# Patient Record
Sex: FEMALE | Race: WHITE | NOT HISPANIC OR LATINO | Employment: OTHER | URBAN - METROPOLITAN AREA
[De-identification: names, ages, dates, MRNs, and addresses within clinical notes are randomized per-mention and may not be internally consistent; named-entity substitution may affect disease eponyms.]

---

## 2017-04-15 RX ORDER — LEVOTHYROXINE SODIUM 100 UG/1
TABLET ORAL
Qty: 30 TABLET | Refills: 3 | Status: SHIPPED | OUTPATIENT
Start: 2017-04-15 | End: 2018-04-18 | Stop reason: SDUPTHER

## 2017-04-15 RX ORDER — METFORMIN HYDROCHLORIDE 1000 MG/1
TABLET ORAL
Qty: 60 TABLET | Refills: 3 | Status: SHIPPED | OUTPATIENT
Start: 2017-04-15 | End: 2018-05-21 | Stop reason: SDUPTHER

## 2018-02-19 ENCOUNTER — TELEPHONE (OUTPATIENT)
Dept: FAMILY MEDICINE | Facility: CLINIC | Age: 63
End: 2018-02-19

## 2018-02-19 DIAGNOSIS — E78.5 HYPERLIPIDEMIA, UNSPECIFIED HYPERLIPIDEMIA TYPE: ICD-10-CM

## 2018-02-19 DIAGNOSIS — E03.9 HYPOTHYROIDISM, UNSPECIFIED TYPE: ICD-10-CM

## 2018-02-19 DIAGNOSIS — I10 HYPERTENSION, UNSPECIFIED TYPE: ICD-10-CM

## 2018-02-19 DIAGNOSIS — R73.03 PREDIABETES: ICD-10-CM

## 2018-02-19 DIAGNOSIS — Z00.00 ROUTINE GENERAL MEDICAL EXAMINATION AT A HEALTH CARE FACILITY: Primary | ICD-10-CM

## 2018-02-19 NOTE — TELEPHONE ENCOUNTER
----- Message from Alva Barrera MA sent at 2/19/2018  3:22 PM CST -----  Contact: Patient  I have scheduled the above patients annual physical and labs. Lab orders need to be linked to the appointment.  Date of annual:03/21/18  Date of Labs:03/14/18    Please advise   Thank You

## 2018-03-14 ENCOUNTER — LAB VISIT (OUTPATIENT)
Dept: LAB | Facility: HOSPITAL | Age: 63
End: 2018-03-14
Attending: FAMILY MEDICINE
Payer: COMMERCIAL

## 2018-03-14 DIAGNOSIS — E03.9 HYPOTHYROIDISM, UNSPECIFIED TYPE: ICD-10-CM

## 2018-03-14 DIAGNOSIS — Z00.00 ROUTINE GENERAL MEDICAL EXAMINATION AT A HEALTH CARE FACILITY: ICD-10-CM

## 2018-03-14 DIAGNOSIS — I10 HYPERTENSION, UNSPECIFIED TYPE: ICD-10-CM

## 2018-03-14 DIAGNOSIS — E78.5 HYPERLIPIDEMIA, UNSPECIFIED HYPERLIPIDEMIA TYPE: ICD-10-CM

## 2018-03-14 DIAGNOSIS — R73.03 PREDIABETES: ICD-10-CM

## 2018-03-14 LAB
ALBUMIN SERPL BCP-MCNC: 3.6 G/DL
ALP SERPL-CCNC: 113 U/L
ALT SERPL W/O P-5'-P-CCNC: 26 U/L
ANION GAP SERPL CALC-SCNC: 8 MMOL/L
AST SERPL-CCNC: 35 U/L
BASOPHILS # BLD AUTO: 0.05 K/UL
BASOPHILS NFR BLD: 0.6 %
BILIRUB SERPL-MCNC: 0.3 MG/DL
BUN SERPL-MCNC: 14 MG/DL
CALCIUM SERPL-MCNC: 10.7 MG/DL
CHLORIDE SERPL-SCNC: 106 MMOL/L
CHOLEST SERPL-MCNC: 147 MG/DL
CHOLEST/HDLC SERPL: 3 {RATIO}
CO2 SERPL-SCNC: 25 MMOL/L
CREAT SERPL-MCNC: 1 MG/DL
DIFFERENTIAL METHOD: ABNORMAL
EOSINOPHIL # BLD AUTO: 0.6 K/UL
EOSINOPHIL NFR BLD: 6.6 %
ERYTHROCYTE [DISTWIDTH] IN BLOOD BY AUTOMATED COUNT: 13 %
EST. GFR  (AFRICAN AMERICAN): >60 ML/MIN/1.73 M^2
EST. GFR  (NON AFRICAN AMERICAN): >60 ML/MIN/1.73 M^2
ESTIMATED AVG GLUCOSE: 123 MG/DL
GLUCOSE SERPL-MCNC: 103 MG/DL
HBA1C MFR BLD HPLC: 5.9 %
HCT VFR BLD AUTO: 35.3 %
HDLC SERPL-MCNC: 49 MG/DL
HDLC SERPL: 33.3 %
HGB BLD-MCNC: 11.5 G/DL
IMM GRANULOCYTES # BLD AUTO: 0.02 K/UL
IMM GRANULOCYTES NFR BLD AUTO: 0.2 %
LDLC SERPL CALC-MCNC: 74.8 MG/DL
LYMPHOCYTES # BLD AUTO: 2.2 K/UL
LYMPHOCYTES NFR BLD: 24.8 %
MCH RBC QN AUTO: 30 PG
MCHC RBC AUTO-ENTMCNC: 32.6 G/DL
MCV RBC AUTO: 92 FL
MONOCYTES # BLD AUTO: 0.6 K/UL
MONOCYTES NFR BLD: 7.2 %
NEUTROPHILS # BLD AUTO: 5.3 K/UL
NEUTROPHILS NFR BLD: 60.6 %
NONHDLC SERPL-MCNC: 98 MG/DL
NRBC BLD-RTO: 0 /100 WBC
PLATELET # BLD AUTO: 341 K/UL
PMV BLD AUTO: 9.3 FL
POTASSIUM SERPL-SCNC: 4.1 MMOL/L
PROT SERPL-MCNC: 7.7 G/DL
RBC # BLD AUTO: 3.83 M/UL
SODIUM SERPL-SCNC: 139 MMOL/L
TRIGL SERPL-MCNC: 116 MG/DL
TSH SERPL DL<=0.005 MIU/L-ACNC: 2.62 UIU/ML
WBC # BLD AUTO: 8.79 K/UL

## 2018-03-14 PROCEDURE — 80061 LIPID PANEL: CPT

## 2018-03-14 PROCEDURE — 85025 COMPLETE CBC W/AUTO DIFF WBC: CPT

## 2018-03-14 PROCEDURE — 36415 COLL VENOUS BLD VENIPUNCTURE: CPT | Mod: PO

## 2018-03-14 PROCEDURE — 80053 COMPREHEN METABOLIC PANEL: CPT

## 2018-03-14 PROCEDURE — 83036 HEMOGLOBIN GLYCOSYLATED A1C: CPT

## 2018-03-14 PROCEDURE — 84443 ASSAY THYROID STIM HORMONE: CPT

## 2018-03-21 ENCOUNTER — OFFICE VISIT (OUTPATIENT)
Dept: FAMILY MEDICINE | Facility: CLINIC | Age: 63
End: 2018-03-21
Payer: COMMERCIAL

## 2018-03-21 ENCOUNTER — HOSPITAL ENCOUNTER (OUTPATIENT)
Dept: RADIOLOGY | Facility: HOSPITAL | Age: 63
Discharge: HOME | End: 2018-03-21
Attending: FAMILY MEDICINE
Payer: COMMERCIAL

## 2018-03-21 VITALS
DIASTOLIC BLOOD PRESSURE: 82 MMHG | WEIGHT: 185.88 LBS | TEMPERATURE: 98 F | HEIGHT: 66 IN | SYSTOLIC BLOOD PRESSURE: 126 MMHG | HEART RATE: 84 BPM | BODY MASS INDEX: 29.87 KG/M2

## 2018-03-21 DIAGNOSIS — R73.03 PREDIABETES: ICD-10-CM

## 2018-03-21 DIAGNOSIS — I10 ESSENTIAL HYPERTENSION: ICD-10-CM

## 2018-03-21 DIAGNOSIS — E03.4 HYPOTHYROIDISM DUE TO ACQUIRED ATROPHY OF THYROID: ICD-10-CM

## 2018-03-21 DIAGNOSIS — Z12.39 SCREENING FOR BREAST CANCER: ICD-10-CM

## 2018-03-21 DIAGNOSIS — M25.472 ANKLE SWELLING, LEFT: ICD-10-CM

## 2018-03-21 DIAGNOSIS — G47.33 OSA (OBSTRUCTIVE SLEEP APNEA): ICD-10-CM

## 2018-03-21 DIAGNOSIS — Z00.00 ROUTINE GENERAL MEDICAL EXAMINATION AT A HEALTH CARE FACILITY: Primary | ICD-10-CM

## 2018-03-21 DIAGNOSIS — E83.52 HYPERCALCEMIA: ICD-10-CM

## 2018-03-21 DIAGNOSIS — E78.00 PURE HYPERCHOLESTEROLEMIA: ICD-10-CM

## 2018-03-21 DIAGNOSIS — F43.9 SITUATIONAL STRESS: ICD-10-CM

## 2018-03-21 LAB
CREAT UR-MCNC: 206 MG/DL
MICROALBUMIN UR DL<=1MG/L-MCNC: 119 UG/ML
MICROALBUMIN/CREATININE RATIO: 57.8 UG/MG

## 2018-03-21 PROCEDURE — 99999 PR PBB SHADOW E&M-EST. PATIENT-LVL IV: CPT | Mod: PBBFAC,,, | Performed by: FAMILY MEDICINE

## 2018-03-21 PROCEDURE — 99396 PREV VISIT EST AGE 40-64: CPT | Mod: S$GLB,,, | Performed by: FAMILY MEDICINE

## 2018-03-21 PROCEDURE — 82043 UR ALBUMIN QUANTITATIVE: CPT

## 2018-03-21 PROCEDURE — 73610 X-RAY EXAM OF ANKLE: CPT | Mod: 26,LT,, | Performed by: RADIOLOGY

## 2018-03-21 PROCEDURE — 73610 X-RAY EXAM OF ANKLE: CPT | Mod: TC,FY,PO,LT

## 2018-03-21 RX ORDER — SIMVASTATIN 40 MG/1
40 TABLET, FILM COATED ORAL NIGHTLY
COMMUNITY
End: 2018-03-29 | Stop reason: ALTCHOICE

## 2018-03-21 RX ORDER — ESCITALOPRAM OXALATE 10 MG/1
10 TABLET ORAL DAILY
Qty: 30 TABLET | Refills: 5 | Status: SHIPPED | OUTPATIENT
Start: 2018-03-21 | End: 2018-08-11 | Stop reason: SDUPTHER

## 2018-03-21 NOTE — PROGRESS NOTES
"Subjective:     Patient ID: Romelia Douglass is a 62 y.o. female.    Chief Complaint: Annual Exam  pts insurance changed last year and she had to see someone else. Now she is back to see me.   She states about a year ago she stretched her left ankle and something "popped" and she has had left ankle swelling ever since. She has been told this was due to venous insufficiency.   HPI  Review of Systems   Constitutional: Positive for fatigue. Negative for appetite change and fever.        Snores, she wakes easily -?gasps for breath   HENT: Negative for hearing loss and sore throat.    Eyes: Negative.    Respiratory: Negative for cough, chest tightness, shortness of breath and wheezing.    Cardiovascular: Negative for chest pain, palpitations and leg swelling.   Gastrointestinal: Negative for abdominal pain, blood in stool, constipation, diarrhea, nausea and vomiting.   Endocrine: Negative.    Genitourinary: Negative for difficulty urinating, dysuria, frequency, hematuria, menstrual problem, pelvic pain and urgency.   Musculoskeletal: Positive for arthralgias (left ankle swelling for a year- she was  diagnosed with a venous insuffiiciency -some mild pain if she has been standing on leg  all day ). Negative for back pain.   Skin: Negative for pallor and rash.   Allergic/Immunologic: Negative.    Neurological: Negative for dizziness, syncope, light-headedness and headaches.   Hematological: Negative for adenopathy.   Psychiatric/Behavioral: Negative for dysphoric mood (having trouble with getting easily upset and trouble focusing and concentrating, she feels anxious . ) and sleep disturbance (she isnt sleeping well.  she has trouble staying  asleep). The patient is nervous/anxious.         Under stress taking care of her dad who ha dementia and her mom has dementia       Objective:      Physical Exam   Constitutional: She is oriented to person, place, and time. She appears well-developed and well-nourished.   HENT:   Head: " Normocephalic and atraumatic.   Right Ear: External ear normal.   Left Ear: External ear normal.   Nose: Nose normal.   Small narrow airway and high riding tongue with wide neck    Eyes: Conjunctivae and EOM are normal. Pupils are equal, round, and reactive to light.   Neck: Normal range of motion. Neck supple. No JVD present. No thyromegaly present.   Cardiovascular: Normal rate, regular rhythm, normal heart sounds and intact distal pulses.    No murmur heard.  Pulmonary/Chest: Effort normal and breath sounds normal.   Abdominal: Soft. Bowel sounds are normal. She exhibits no mass. There is no tenderness.   Musculoskeletal: Normal range of motion. She exhibits no edema (swelling , large effusion about entire left ankle. no edema of left foot or lower left leg otherwise.).   Lymphadenopathy:     She has no cervical adenopathy.   Neurological: She is alert and oriented to person, place, and time. She has normal reflexes.   Skin: Skin is warm and dry.   Psychiatric: She has a normal mood and affect. Her behavior is normal. Judgment and thought content normal.   Vitals reviewed.      Assessment:     Romelia was seen today for annual exam.    Diagnoses and all orders for this visit:    Routine general medical examination at a health care facility    Screening for breast cancer  -     Mammo Digital Screening Bilat with CAD; Future    ROSANGELA (obstructive sleep apnea)  -     Home Sleep Studies; Future    Hypercalcemia  -     PTH, intact; Future  -     Calcium; Future    Ankle swelling, left  -     X-Ray Ankle Complete Left; Future    Essential hypertension  -     Microalbumin/creatinine urine ratio    Pure hypercholesterolemia  The current medical regimen is effective;  continue present plan and medications.    Hypothyroidism due to acquired atrophy of thyroid  Condition stable . Continue current medicine protocol.     Prediabetes  -     Microalbumin/creatinine urine ratio    Situational stress  -     escitalopram oxalate  (LEXAPRO) 10 MG tablet; Take 1 tablet (10 mg total) by mouth once daily.    Recheck in 2 months and will do pap then

## 2018-03-22 ENCOUNTER — TELEPHONE (OUTPATIENT)
Dept: FAMILY MEDICINE | Facility: CLINIC | Age: 63
End: 2018-03-22

## 2018-03-22 DIAGNOSIS — M25.472 EDEMA OF LEFT ANKLE: Primary | ICD-10-CM

## 2018-03-22 NOTE — TELEPHONE ENCOUNTER
Please notify pt that her xrays shows some arthritis and bone spurs and the swelling. Nothing else  Worrisome.  I will order the MRI

## 2018-03-23 ENCOUNTER — LAB VISIT (OUTPATIENT)
Dept: LAB | Facility: HOSPITAL | Age: 63
End: 2018-03-23
Attending: FAMILY MEDICINE
Payer: COMMERCIAL

## 2018-03-23 DIAGNOSIS — E83.52 HYPERCALCEMIA: ICD-10-CM

## 2018-03-23 LAB
CALCIUM SERPL-MCNC: 11 MG/DL
PTH-INTACT SERPL-MCNC: 120 PG/ML

## 2018-03-23 PROCEDURE — 82310 ASSAY OF CALCIUM: CPT

## 2018-03-23 PROCEDURE — 83970 ASSAY OF PARATHORMONE: CPT

## 2018-03-23 PROCEDURE — 36415 COLL VENOUS BLD VENIPUNCTURE: CPT | Mod: PO

## 2018-03-23 NOTE — TELEPHONE ENCOUNTER
Detailed VM left for the patient advising per Dr. Mendez's message below. Advised that she call back with any questions.

## 2018-03-25 ENCOUNTER — TELEPHONE (OUTPATIENT)
Dept: FAMILY MEDICINE | Facility: CLINIC | Age: 63
End: 2018-03-25

## 2018-03-25 DIAGNOSIS — E21.3 HYPERPARATHYROIDISM: Primary | ICD-10-CM

## 2018-03-26 NOTE — TELEPHONE ENCOUNTER
Please notify Shanelle that her repeat calcium level was high and so was the parathyroid level.  I am going to recommend that she meet with an endocrinologist for a consultation about this and see if they recommend any special treatment or additional testing. I will put in a referral to them   Also tell pt that her urine protein level was a bit high that suggests some strain on her kidneys. We need to control her blood pressure better to reduce strain on her kidneys

## 2018-03-29 ENCOUNTER — TELEPHONE (OUTPATIENT)
Dept: FAMILY MEDICINE | Facility: CLINIC | Age: 63
End: 2018-03-29

## 2018-03-29 RX ORDER — ATORVASTATIN CALCIUM 40 MG/1
40 TABLET, FILM COATED ORAL DAILY
Qty: 30 TABLET | Refills: 5 | Status: SHIPPED | OUTPATIENT
Start: 2018-03-29 | End: 2018-07-27 | Stop reason: SDUPTHER

## 2018-03-29 NOTE — TELEPHONE ENCOUNTER
"----- Message from Lor Galvan sent at 3/29/2018  9:39 AM CDT -----  Contact: pt [phone]  RX request - refill or new RX.  Is this a refill or new RX:  New   RX name and strength: atorvastin  Directions:   Is this a 30 day or 90 day RX:    Pharmacy name and phone # (DON'T enter "on file" or "in chart"): [id number] [place] [facility] [address] [phone] (Phone)  [phone] (Fax)  Comments:  Pt would like a call back on the status of this getting filled. Pt is confused about which medicine is for calcium and which one is for blood pressure. Please advise.       "

## 2018-03-29 NOTE — TELEPHONE ENCOUNTER
I have changed the med card to atorvastatin.  And no I dont believe the statin med choice has anything to do with her calcium being elevated.

## 2018-03-29 NOTE — TELEPHONE ENCOUNTER
Patient is requesting a refill for atorvastatin. Patient has simvastatin on medication list. Patient verbalizes that her calcium is elevated and is wondering if it could be caused by the atorvastatin. Patient voices that if calcium is elevated by atorvastatin should it be changed to simvastatin. Please advise.

## 2018-04-06 ENCOUNTER — TELEPHONE (OUTPATIENT)
Dept: FAMILY MEDICINE | Facility: CLINIC | Age: 63
End: 2018-04-06

## 2018-04-06 DIAGNOSIS — M25.472 EDEMA OF LEFT ANKLE: Primary | ICD-10-CM

## 2018-04-06 DIAGNOSIS — L53.9 ERYTHEMA: ICD-10-CM

## 2018-04-06 NOTE — TELEPHONE ENCOUNTER
----- Message from Angelikaaura Light sent at 4/6/2018 12:08 PM CDT -----  Contact: Patient [phone]  Patient is requesting a call about having her MRI at [facility] and need to have her orders.     Please call and advise.    Thank You

## 2018-04-06 NOTE — TELEPHONE ENCOUNTER
----- Message from Alyssa Gupta sent at 4/6/2018 11:43 AM CDT -----  Contact: Self Call  [phone]  Patient insurance will not cover her MRI at [facility]. Appointment this morning was cancelled. Need this to be done at [facility].

## 2018-04-16 DIAGNOSIS — I10 ESSENTIAL HYPERTENSION: ICD-10-CM

## 2018-04-16 NOTE — TELEPHONE ENCOUNTER
----- Message from Vi Wilcox sent at 4/16/2018 11:10 AM CDT -----  Contact: self [phone]  Type: Rx    Name of medication(s):  losartan (COZAAR) 100 MG tablet    Is this a refill? New rx? Refill       Who prescribed medication?    Pharmacy Name, Phone, & Location: [facility] [phone]     Comments: please advise, Thanks

## 2018-04-18 RX ORDER — LOSARTAN POTASSIUM 100 MG/1
100 TABLET ORAL DAILY
Qty: 30 TABLET | Refills: 5 | Status: SHIPPED | OUTPATIENT
Start: 2018-04-18 | End: 2018-07-18 | Stop reason: SDUPTHER

## 2018-04-18 RX ORDER — LEVOTHYROXINE SODIUM 100 UG/1
100 TABLET ORAL DAILY
Qty: 30 TABLET | Refills: 5 | Status: SHIPPED | OUTPATIENT
Start: 2018-04-18 | End: 2018-07-18 | Stop reason: SDUPTHER

## 2018-04-18 NOTE — TELEPHONE ENCOUNTER
"----- Message from Jacqueline Melendez sent at 4/18/2018  3:27 PM CDT -----  Contact: call pt a t  RX request - refill or new RX.  Is this a refill or new RX:  refill  RX name and strength: atorvastatin (LIPITOR) 40 MG tablet  Directions:   Is this a 30 day or 90 day RX:  30 day   Pharmacy name and phone # (DON'T enter "on file" or "in chart"): [facility]/pharmacy #[id number] - [place], [place] - [address] [phone] (Phone)   Comments:      RX request - refill or new RX.  Is this a refill or new RX:  refill  RX name and strength: escitalopram oxalate (LEXAPRO) 10 MG tablet  Directions:   Is this a 30 day or 90 day RX:  30 day   Pharmacy name and phone # (DON'T enter "on file" or "in chart"): [facility]/pharmacy #[id number] [facility] - [address] [phone] (Phone)   Comments:      RX request - refill or new RX.  Is this a refill or new RX:  refill  RX name and strength: levothyroxine (SYNTHROID) 100 MCG tablet  Directions:   Is this a 30 day or 90 day RX:  30 day  Pharmacy name and phone # (DON'T enter "on file" or "in chart"): [facility]/pharmacy #[id number] [facility]  [address] [phone] (Phone)   Comments:        "

## 2018-04-20 ENCOUNTER — TELEPHONE (OUTPATIENT)
Dept: FAMILY MEDICINE | Facility: CLINIC | Age: 63
End: 2018-04-20

## 2018-04-24 ENCOUNTER — TELEPHONE (OUTPATIENT)
Dept: FAMILY MEDICINE | Facility: CLINIC | Age: 63
End: 2018-04-24

## 2018-04-24 NOTE — TELEPHONE ENCOUNTER
----- Message from Lor Galvan sent at 4/23/2018  4:34 PM CDT -----  Contact: Pt. [phone]  Pt would like to know if   would like a CD of her MRI.

## 2018-04-24 NOTE — TELEPHONE ENCOUNTER
Patient advised that Dr. Mendez only needs the report at this time, but the hold on to the CD incase she is sent to a specialist.

## 2018-04-27 ENCOUNTER — TELEPHONE (OUTPATIENT)
Dept: FAMILY MEDICINE | Facility: CLINIC | Age: 63
End: 2018-04-27

## 2018-04-27 NOTE — TELEPHONE ENCOUNTER
Patient stated that the pharmacy contacted her & advised that she call her PCP about her medication. Contacted the pharmacy, they advised that the patient was given the wrong information & that all the prescriptions are there with refills. Patient has been advised & will contact the pharmacy back.

## 2018-04-27 NOTE — TELEPHONE ENCOUNTER
----- Message from Neelima Dallas sent at 4/27/2018 11:43 AM CDT -----  Contact: patient [phone]  Pt went to pharmacy to pickup meds and was told to call the office. She has an appt on 5/28.

## 2018-04-30 ENCOUNTER — TELEPHONE (OUTPATIENT)
Dept: FAMILY MEDICINE | Facility: CLINIC | Age: 63
End: 2018-04-30

## 2018-04-30 DIAGNOSIS — M25.572 PAIN AND SWELLING OF ANKLE, LEFT: Primary | ICD-10-CM

## 2018-04-30 DIAGNOSIS — M25.472 PAIN AND SWELLING OF ANKLE, LEFT: Primary | ICD-10-CM

## 2018-05-01 NOTE — TELEPHONE ENCOUNTER
Patient has been informed, and verbalized understanding.    Call transferred to the referral coordinator to schedule. MRI will be faxed to Dr. Pritchett's staff.

## 2018-05-01 NOTE — TELEPHONE ENCOUNTER
Please notify pt that her ankle MRI shows extensive swelling of the joint and tendons around the joint. And some torn tendons. And some cysts of the bone.  I recommend she see an ankle specialist.  I will put in a referral to them.

## 2018-05-08 ENCOUNTER — TELEPHONE (OUTPATIENT)
Dept: FAMILY MEDICINE | Facility: CLINIC | Age: 63
End: 2018-05-08

## 2018-05-08 DIAGNOSIS — S99.912S INJURY OF LEFT ANKLE, SEQUELA: ICD-10-CM

## 2018-05-08 DIAGNOSIS — M25.472 LEFT ANKLE SWELLING: Primary | ICD-10-CM

## 2018-05-08 NOTE — TELEPHONE ENCOUNTER
----- Message from Meme Choudhury sent at 5/8/2018 10:07 AM CDT -----  Contact: self/[phone]  Pt states that she needs MRI results sent to the doctors office at [facility]. She states the doctors office over there is covered by her insurance. She states that she would like to go to  the number is [phone]. Please advise.      Thanks

## 2018-05-21 RX ORDER — METFORMIN HYDROCHLORIDE 1000 MG/1
1000 TABLET ORAL 2 TIMES DAILY WITH MEALS
Qty: 60 TABLET | Refills: 3 | Status: SHIPPED | OUTPATIENT
Start: 2018-05-21 | End: 2018-09-28 | Stop reason: SDUPTHER

## 2018-05-21 NOTE — TELEPHONE ENCOUNTER
"----- Message from Lor Galvan sent at 5/21/2018 10:23 AM CDT -----  Contact: -[phone]  RX request - refill or new RX.  Is this a refill or new RX:  Refill  RX name and strength: metformin (GLUCOPHAGE) 1000 MG tablet  Directions:   Is this a 30 day or 90 day RX:    Pharmacy name and phone # (DON'T enter "on file" or "in chart"): [id number] [place] [facility] [place] - [address] [phone] (Phone)  [phone] (Fax)    Comments:      Pt will like a call back from the nurse regarding her MRI      "

## 2018-07-18 DIAGNOSIS — I10 ESSENTIAL HYPERTENSION: ICD-10-CM

## 2018-07-18 RX ORDER — LOSARTAN POTASSIUM 100 MG/1
100 TABLET ORAL DAILY
Qty: 90 TABLET | Refills: 1 | Status: SHIPPED | OUTPATIENT
Start: 2018-07-18 | End: 2019-02-14 | Stop reason: SDUPTHER

## 2018-07-18 RX ORDER — LEVOTHYROXINE SODIUM 100 UG/1
100 TABLET ORAL DAILY
Qty: 90 TABLET | Refills: 1 | Status: SHIPPED | OUTPATIENT
Start: 2018-07-18 | End: 2019-02-14 | Stop reason: SDUPTHER

## 2018-07-27 RX ORDER — ATORVASTATIN CALCIUM 40 MG/1
40 TABLET, FILM COATED ORAL DAILY
Qty: 30 TABLET | Refills: 5 | Status: SHIPPED | OUTPATIENT
Start: 2018-07-27 | End: 2019-01-19 | Stop reason: SDUPTHER

## 2018-08-08 ENCOUNTER — TELEPHONE (OUTPATIENT)
Dept: FAMILY MEDICINE | Facility: CLINIC | Age: 63
End: 2018-08-08

## 2018-08-08 NOTE — TELEPHONE ENCOUNTER
----- Message from Meme Choudhury sent at 8/8/2018 10:36 AM CDT -----  Contact: Romelia/Podiatry/[phone]  Office is requesting an MRI as soon as possible. They state that they have already sent over medical records request. Fax is [phone]. They state that the pt is there right now and they need this done as soon as possible. Please advise.      Thanks

## 2018-08-11 DIAGNOSIS — F43.9 SITUATIONAL STRESS: ICD-10-CM

## 2018-08-12 RX ORDER — ESCITALOPRAM OXALATE 10 MG/1
10 TABLET ORAL DAILY
Qty: 90 TABLET | Refills: 2 | Status: SHIPPED | OUTPATIENT
Start: 2018-08-12 | End: 2019-05-13

## 2018-08-22 DIAGNOSIS — F43.9 SITUATIONAL STRESS: ICD-10-CM

## 2018-08-22 RX ORDER — ESCITALOPRAM OXALATE 10 MG/1
10 TABLET ORAL DAILY
Qty: 90 TABLET | Refills: 1 | Status: SHIPPED | OUTPATIENT
Start: 2018-08-22 | End: 2019-02-14 | Stop reason: SDUPTHER

## 2018-09-28 DIAGNOSIS — R73.03 PREDIABETES: Primary | ICD-10-CM

## 2018-09-28 RX ORDER — ONDANSETRON 4 MG/1
TABLET, FILM COATED ORAL
COMMUNITY
Start: 2018-08-29 | End: 2019-06-21

## 2018-09-28 RX ORDER — METFORMIN HYDROCHLORIDE 1000 MG/1
1000 TABLET ORAL 2 TIMES DAILY WITH MEALS
Qty: 60 TABLET | Refills: 3 | Status: CANCELLED | OUTPATIENT
Start: 2018-09-28

## 2018-09-28 RX ORDER — METFORMIN HYDROCHLORIDE 1000 MG/1
1000 TABLET ORAL 2 TIMES DAILY WITH MEALS
Qty: 60 TABLET | Refills: 3 | Status: SHIPPED | OUTPATIENT
Start: 2018-09-28 | End: 2019-01-19 | Stop reason: SDUPTHER

## 2018-09-28 RX ORDER — HYDROCODONE BITARTRATE AND ACETAMINOPHEN 7.5; 325 MG/1; MG/1
TABLET ORAL
COMMUNITY
Start: 2018-09-14 | End: 2019-06-21

## 2018-09-28 NOTE — TELEPHONE ENCOUNTER
----- Message from Meme Choudhury sent at 9/28/2018  9:40 AM CDT -----  Contact: [facility]/[phone]  Type: Rx    Name of medication(s): metFORMIN (GLUCOPHAGE) 1000 MG tablet    Is this a refill? New rx? Refill     Who prescribed medication? ARA Hardin      Pharmacy Name, Phone, & Location:[facility]/pharmacy #[id number] - [place], [place] - [address]    Comments: Please advise.      Thanks

## 2018-09-28 NOTE — LETTER
September 28, 2018    Romelia Douglass  [address] [address]             [facility]  [address] [facility], [address]  [facility] [address]  Phone: [phone]  Fax: [phone] Dear Ms. Douglass:      Our records indicate that you are due for a follow up visit with Dr. Mendez, and that your pap smear will be due at that time as well. Please call our office at [phone] to schedule. If already scheduled, please disregard.        If you have any questions or concerns, please don't hesitate to call.      Sincerely,        Maggy Chase MA

## 2018-09-28 NOTE — TELEPHONE ENCOUNTER
LOV 03/21/18    Letter has been mailed to the patient to inform them that an appointment is due.

## 2019-01-19 DIAGNOSIS — R73.03 PREDIABETES: ICD-10-CM

## 2019-01-21 RX ORDER — ATORVASTATIN CALCIUM 40 MG/1
40 TABLET, FILM COATED ORAL DAILY
Qty: 30 TABLET | Refills: 2 | Status: SHIPPED | OUTPATIENT
Start: 2019-01-21 | End: 2019-06-03 | Stop reason: SDUPTHER

## 2019-01-21 RX ORDER — METFORMIN HYDROCHLORIDE 1000 MG/1
TABLET ORAL
Qty: 60 TABLET | Refills: 2 | Status: SHIPPED | OUTPATIENT
Start: 2019-01-21 | End: 2019-05-20 | Stop reason: SDUPTHER

## 2019-01-21 NOTE — TELEPHONE ENCOUNTER
EPP 3/21/18.  Letter mailed to patient that temporary refill has been sent and she is due for an annual exam with labs.

## 2019-02-14 DIAGNOSIS — I10 ESSENTIAL HYPERTENSION: ICD-10-CM

## 2019-02-14 DIAGNOSIS — F43.9 SITUATIONAL STRESS: ICD-10-CM

## 2019-02-14 RX ORDER — ESCITALOPRAM OXALATE 10 MG/1
TABLET ORAL
Qty: 90 TABLET | Refills: 0 | Status: SHIPPED | OUTPATIENT
Start: 2019-02-14 | End: 2019-05-11 | Stop reason: SDUPTHER

## 2019-02-14 RX ORDER — LOSARTAN POTASSIUM 100 MG/1
TABLET ORAL
Qty: 90 TABLET | Refills: 0 | Status: SHIPPED | OUTPATIENT
Start: 2019-02-14 | End: 2019-05-11 | Stop reason: SDUPTHER

## 2019-02-14 RX ORDER — LEVOTHYROXINE SODIUM 100 UG/1
100 TABLET ORAL DAILY
Qty: 90 TABLET | Refills: 0 | Status: SHIPPED | OUTPATIENT
Start: 2019-02-14 | End: 2019-05-11 | Stop reason: SDUPTHER

## 2019-02-14 NOTE — TELEPHONE ENCOUNTER
Letter was mailed to pt by Dr Mendez for her to choose a new PCP & make PHYS appt. No need to call pt

## 2019-05-05 DIAGNOSIS — R73.03 PREDIABETES: ICD-10-CM

## 2019-05-08 RX ORDER — ATORVASTATIN CALCIUM 40 MG/1
TABLET, FILM COATED ORAL
Qty: 30 TABLET | Refills: 2 | OUTPATIENT
Start: 2019-05-08

## 2019-05-08 RX ORDER — METFORMIN HYDROCHLORIDE 1000 MG/1
TABLET ORAL
Qty: 60 TABLET | Refills: 2 | OUTPATIENT
Start: 2019-05-08

## 2019-05-11 DIAGNOSIS — F43.9 SITUATIONAL STRESS: ICD-10-CM

## 2019-05-11 DIAGNOSIS — I10 ESSENTIAL HYPERTENSION: ICD-10-CM

## 2019-05-13 RX ORDER — LEVOTHYROXINE SODIUM 100 UG/1
TABLET ORAL
Qty: 90 TABLET | Refills: 0 | Status: SHIPPED | OUTPATIENT
Start: 2019-05-13 | End: 2019-06-21 | Stop reason: SDUPTHER

## 2019-05-13 RX ORDER — LOSARTAN POTASSIUM 100 MG/1
TABLET ORAL
Qty: 90 TABLET | Refills: 0 | Status: SHIPPED | OUTPATIENT
Start: 2019-05-13 | End: 2019-06-21 | Stop reason: SDUPTHER

## 2019-05-13 RX ORDER — ESCITALOPRAM OXALATE 10 MG/1
TABLET ORAL
Qty: 90 TABLET | Refills: 0 | Status: SHIPPED | OUTPATIENT
Start: 2019-05-13 | End: 2019-06-21 | Stop reason: SDUPTHER

## 2019-05-20 DIAGNOSIS — R73.03 PREDIABETES: ICD-10-CM

## 2019-05-20 RX ORDER — METFORMIN HYDROCHLORIDE 1000 MG/1
TABLET ORAL
Qty: 60 TABLET | Refills: 0 | Status: SHIPPED | OUTPATIENT
Start: 2019-05-20 | End: 2019-06-18 | Stop reason: SDUPTHER

## 2019-06-03 RX ORDER — ATORVASTATIN CALCIUM 40 MG/1
TABLET, FILM COATED ORAL
Qty: 30 TABLET | Refills: 0 | Status: SHIPPED | OUTPATIENT
Start: 2019-06-03 | End: 2019-06-21 | Stop reason: SDUPTHER

## 2019-06-18 DIAGNOSIS — R73.03 PREDIABETES: ICD-10-CM

## 2019-06-18 RX ORDER — METFORMIN HYDROCHLORIDE 1000 MG/1
TABLET ORAL
Qty: 60 TABLET | Refills: 0 | Status: SHIPPED | OUTPATIENT
Start: 2019-06-18 | End: 2019-06-21 | Stop reason: SDUPTHER

## 2019-06-21 ENCOUNTER — OFFICE VISIT (OUTPATIENT)
Dept: INTERNAL MEDICINE | Facility: CLINIC | Age: 64
End: 2019-06-21
Payer: COMMERCIAL

## 2019-06-21 VITALS
BODY MASS INDEX: 29.68 KG/M2 | HEIGHT: 65 IN | DIASTOLIC BLOOD PRESSURE: 68 MMHG | WEIGHT: 178.13 LBS | SYSTOLIC BLOOD PRESSURE: 115 MMHG | HEART RATE: 84 BPM

## 2019-06-21 DIAGNOSIS — Z86.39 HX OF HYPERCALCEMIA: ICD-10-CM

## 2019-06-21 DIAGNOSIS — F41.9 ANXIETY: ICD-10-CM

## 2019-06-21 DIAGNOSIS — F43.9 SITUATIONAL STRESS: ICD-10-CM

## 2019-06-21 DIAGNOSIS — R73.03 PREDIABETES: ICD-10-CM

## 2019-06-21 DIAGNOSIS — E78.00 PURE HYPERCHOLESTEROLEMIA: ICD-10-CM

## 2019-06-21 DIAGNOSIS — I10 ESSENTIAL HYPERTENSION: ICD-10-CM

## 2019-06-21 DIAGNOSIS — E03.4 HYPOTHYROIDISM DUE TO ACQUIRED ATROPHY OF THYROID: ICD-10-CM

## 2019-06-21 DIAGNOSIS — Z00.00 ANNUAL PHYSICAL EXAM: Primary | ICD-10-CM

## 2019-06-21 DIAGNOSIS — Z12.31 ENCOUNTER FOR SCREENING MAMMOGRAM FOR BREAST CANCER: ICD-10-CM

## 2019-06-21 PROCEDURE — 3078F PR MOST RECENT DIASTOLIC BLOOD PRESSURE < 80 MM HG: ICD-10-PCS | Mod: CPTII,S$GLB,, | Performed by: INTERNAL MEDICINE

## 2019-06-21 PROCEDURE — 3074F PR MOST RECENT SYSTOLIC BLOOD PRESSURE < 130 MM HG: ICD-10-PCS | Mod: CPTII,S$GLB,, | Performed by: INTERNAL MEDICINE

## 2019-06-21 PROCEDURE — 99999 PR PBB SHADOW E&M-EST. PATIENT-LVL III: CPT | Mod: PBBFAC,,, | Performed by: INTERNAL MEDICINE

## 2019-06-21 PROCEDURE — 3074F SYST BP LT 130 MM HG: CPT | Mod: CPTII,S$GLB,, | Performed by: INTERNAL MEDICINE

## 2019-06-21 PROCEDURE — 99396 PREV VISIT EST AGE 40-64: CPT | Mod: S$GLB,,, | Performed by: INTERNAL MEDICINE

## 2019-06-21 PROCEDURE — 3078F DIAST BP <80 MM HG: CPT | Mod: CPTII,S$GLB,, | Performed by: INTERNAL MEDICINE

## 2019-06-21 PROCEDURE — 99999 PR PBB SHADOW E&M-EST. PATIENT-LVL III: ICD-10-PCS | Mod: PBBFAC,,, | Performed by: INTERNAL MEDICINE

## 2019-06-21 PROCEDURE — 99396 PR PREVENTIVE VISIT,EST,40-64: ICD-10-PCS | Mod: S$GLB,,, | Performed by: INTERNAL MEDICINE

## 2019-06-21 RX ORDER — ESCITALOPRAM OXALATE 10 MG/1
10 TABLET ORAL DAILY
Qty: 90 TABLET | Refills: 3 | Status: SHIPPED | OUTPATIENT
Start: 2019-06-21 | End: 2020-08-24

## 2019-06-21 RX ORDER — ATORVASTATIN CALCIUM 40 MG/1
40 TABLET, FILM COATED ORAL DAILY
Qty: 90 TABLET | Refills: 3 | Status: SHIPPED | OUTPATIENT
Start: 2019-06-21 | End: 2020-06-19

## 2019-06-21 RX ORDER — METFORMIN HYDROCHLORIDE 1000 MG/1
1000 TABLET ORAL 2 TIMES DAILY WITH MEALS
Qty: 180 TABLET | Refills: 3 | Status: SHIPPED | OUTPATIENT
Start: 2019-06-21 | End: 2020-08-25

## 2019-06-21 RX ORDER — LEVOTHYROXINE SODIUM 100 UG/1
100 TABLET ORAL DAILY
Qty: 90 TABLET | Refills: 3 | Status: SHIPPED | OUTPATIENT
Start: 2019-06-21 | End: 2020-08-24

## 2019-06-21 RX ORDER — LOSARTAN POTASSIUM 100 MG/1
100 TABLET ORAL DAILY
Qty: 90 TABLET | Refills: 3 | Status: SHIPPED | OUTPATIENT
Start: 2019-06-21 | End: 2020-09-05 | Stop reason: SDUPTHER

## 2019-06-21 NOTE — PROGRESS NOTES
"Subjective:       Patient ID: Romelia Douglass is a 64 y.o. female.    Chief Complaint: Annual Exam   This is a 64-year-old who presents today for physical patient reports she needs to establish since she was previously following with Dr. Mendez   Patient reports she has a history of prediabetes an insulin resistance for which she was taking  his metformin regularly without difficulty she reports that she has history of cholesterol issues and has been taking cholesterol medicine and losartan for her blood pressure.  Blood pressure has been well controlled and she also is in need of refills.  She has a history of hypothyroidism is on Synthroid as well.  Feels that she has been feeling fairly well overall and she does go to the gym to exercise regularly.  Patient reports that she had a history of cyst in her ankle .  She had trouble intermittently and went to an [facility] orthopedist where she had surgery ultimately .  She is not sure if this helps her much but she has continued to have cyst on occasion she may make a follow up with orthopedist to consider aspiration.  She denies any other joint issues.  She has history of anxiety and was placed on Lexapro about a year ago for anxiety situational stress and feels that has done very well for her.  She would like a refill and she plans to maintain her medication.  She no longer drinks and she quit smoking years back.  She does follow with an [facility] dermatologist dr. hou  History of skin cancer and plans to make her annual    HPI  Review of Systems   Constitutional: Negative for fever.   Respiratory: Negative for cough, shortness of breath and wheezing.    Cardiovascular: Negative for chest pain and palpitations.   Gastrointestinal: Negative for abdominal pain and constipation.   Neurological: Negative for dizziness.       Objective:     Blood pressure 115/68, pulse 84, height 5' 5" (1.651 m), weight 80.8 kg (178 lb 2.1 oz), last menstrual period 10/26/2011.    Physical " Exam   Constitutional: No distress.   HENT:   Head: Normocephalic.   Mouth/Throat: Oropharynx is clear and moist.   Eyes: No scleral icterus.   Neck: Neck supple.   Cardiovascular: Normal rate, regular rhythm and normal heart sounds. Exam reveals no gallop and no friction rub.   No murmur heard.  Pulmonary/Chest: Effort normal and breath sounds normal. No respiratory distress.   Breast : normal no masses or tenderness    Abdominal: Soft. Bowel sounds are normal. She exhibits no mass. There is no tenderness.   Musculoskeletal: She exhibits no edema.   Surgical scar left ankle   Small palpable cyst loczlized swelling    Neurological: She is alert.   Skin: No erythema.   Psychiatric: She has a normal mood and affect.   Vitals reviewed.      Assessment:       1. Annual physical exam    2. Essential hypertension    3. Hypothyroidism due to acquired atrophy of thyroid    4. Prediabetes    5. Pure hypercholesterolemia    6. Encounter for screening mammogram for breast cancer    7. Situational stress    8. Anxiety    9. Hx of hypercalcemia        Plan:       Romelia was seen today for annual exam.    Diagnoses and all orders for this visit:    Annual physical exam  -     Basic metabolic panel; Future  -     CBC auto differential; Future  -     Hemoglobin A1c; Future  -     Hepatic function panel; Future  -     Lipid panel; Future  -     TSH; Future  -     T4, free; Future  -     Vitamin D; Future  -     PTH, intact; Future    Essential hypertension  Discussed with patient reviewed her prior labs and previous chest x-ray question of cardiomegaly she declines further testing at this time is euvolemic and asymptomatic continue blood pressure control encouraged  -     losartan (COZAAR) 100 MG tablet; Take 1 tablet (100 mg total) by mouth once daily.  -     Microalbumin/creatinine urine ratio; Future    Hypothyroidism due to acquired atrophy of thyroid  History of euthyroid will continue current regimen adjust if  needed    Prediabetes  She remains on metformin due to insulin resistance pre diabetes  -     metFORMIN (GLUCOPHAGE) 1000 MG tablet; Take 1 tablet (1,000 mg total) by mouth 2 (two) times daily with meals.  -     Microalbumin/creatinine urine ratio; Future    Pure hypercholesterolemia  History of she is tolerating her medications without difficulty    Encounter for screening mammogram for breast cancer  -     Mammo Digital Screening Bilat w/ Kanu; Future      Anxiety  Situational stressors she remains on Lexapro and tolerating without difficulty  She has a family member that she cares for with dementia  -     escitalopram oxalate (LEXAPRO) 10 MG tablet; Take 1 tablet (10 mg total) by mouth once daily.    Other orders  -     levothyroxine (SYNTHROID) 100 MCG tablet; Take 1 tablet (100 mcg total) by mouth once daily.  -     atorvastatin (LIPITOR) 40 MG tablet; Take 1 tablet (40 mg total) by mouth once daily.    Discussed shingrix vaccine when availailable     History of hypercalcemia hyperparathyroidism on previous labs we discussed the addition vitamin-D avoidance of supplemental calcium and will recheck blood work    Gyn for her annual pap/pelvic recommended  Order in for her mammogram     She is up-to-date on her colonoscopy    Discussed 6 month follow up she prefers annual sooner if concern

## 2019-11-02 ENCOUNTER — PATIENT OUTREACH (OUTPATIENT)
Dept: ADMINISTRATIVE | Facility: HOSPITAL | Age: 64
End: 2019-11-02

## 2019-11-02 NOTE — PROGRESS NOTES
Health Maintenance Due   Topic Date Due    Shingles Vaccine (2 of 3) 03/08/2016    Pap Smear with HPV Cotest  10/26/2017    Mammogram  10/14/2018     Chart review completed 11/02/2019  Pap smear not in lab miroslava

## 2020-08-28 DIAGNOSIS — Z12.39 BREAST CANCER SCREENING: ICD-10-CM

## 2022-10-18 ENCOUNTER — PROCEDURE VISIT (OUTPATIENT)
Dept: DERMATOLOGY | Facility: CLINIC | Age: 67
End: 2022-10-18
Payer: MEDICARE

## 2022-10-18 VITALS
HEART RATE: 65 BPM | SYSTOLIC BLOOD PRESSURE: 134 MMHG | DIASTOLIC BLOOD PRESSURE: 93 MMHG | BODY MASS INDEX: 29.66 KG/M2 | HEIGHT: 65 IN | WEIGHT: 178 LBS

## 2022-10-18 DIAGNOSIS — C44.311 BASAL CELL CARCINOMA OF NASAL TIP: Primary | ICD-10-CM

## 2022-10-18 PROCEDURE — 14060 TIS TRNFR E/N/E/L 10 SQ CM/<: CPT | Mod: S$GLB,,, | Performed by: DERMATOLOGY

## 2022-10-18 PROCEDURE — 17311: ICD-10-PCS | Mod: 51,S$GLB,, | Performed by: DERMATOLOGY

## 2022-10-18 PROCEDURE — 17312 MOHS ADDL STAGE: CPT | Mod: S$GLB,,, | Performed by: DERMATOLOGY

## 2022-10-18 PROCEDURE — 17312: ICD-10-PCS | Mod: S$GLB,,, | Performed by: DERMATOLOGY

## 2022-10-18 PROCEDURE — 17311 MOHS 1 STAGE H/N/HF/G: CPT | Mod: 51,S$GLB,, | Performed by: DERMATOLOGY

## 2022-10-18 PROCEDURE — 99499 NO LOS: ICD-10-PCS | Mod: S$GLB,,, | Performed by: DERMATOLOGY

## 2022-10-18 PROCEDURE — 99499 UNLISTED E&M SERVICE: CPT | Mod: S$GLB,,, | Performed by: DERMATOLOGY

## 2022-10-18 PROCEDURE — 14060 PR ADJ TISS XFER LID,NOS,EAR <10 SQCM: ICD-10-PCS | Mod: S$GLB,,, | Performed by: DERMATOLOGY

## 2022-10-18 RX ORDER — CEPHALEXIN 500 MG/1
500 CAPSULE ORAL 3 TIMES DAILY
Qty: 21 CAPSULE | Refills: 0 | Status: SHIPPED | OUTPATIENT
Start: 2022-10-18 | End: 2022-10-25

## 2022-10-18 RX ORDER — HYDROCODONE BITARTRATE AND ACETAMINOPHEN 5; 325 MG/1; MG/1
1 TABLET ORAL EVERY 6 HOURS PRN
Qty: 10 TABLET | Refills: 0 | Status: SHIPPED | OUTPATIENT
Start: 2022-10-18

## 2022-10-18 NOTE — PROGRESS NOTES
New patient with a 2-3 year h/o growth on the nasal tip. Patient thought it was a pimple that wouldn't heal. Denies bleeding, scabbing, crusting. Bx c/w BCC. No prior treatment.    Physical Exam   Skin:           Nasal tip with a 5 x 5 mm pink bx site located 6 cm inferiorly from the mid-glabella and 2 cm superiorly from the columellar base.    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  Diagnosis, photograph, and pathology report were reviewed with patient.  Discussed risks, benefits, and alternatives of Mohs surgery.  Discussed repair options including complex closure, skin flap, skin graft, and second intention healing.  Patient agreed to proceed with Mohs surgery today.      PROCEDURE: Mohs' Micrographic Surgery    INDICATION: Location in mask areas of face including central face, nose, eyelids, eyebrows, lips, chin, preauricular, temple, and ear. Biopsy-proven skin cancer of cosmetically and functionally important areas, including head, neck, genital, hand, foot, or areas known for having difficulty in healing, such as the lower anterior legs. Tumor with ill-defined borders.    REFERRING PROVIDER:  Edith Daley MD    CASE NUMBER:     ANESTHETIC: 2.5 cc 0.5% Lidocaine with Epi 1:200,000 mixed 1:1 with 0.5% Bupivacaine    SURGICAL PREP: Hibiclens    SURGEON: Jose Higgins MD    ASSISTANTS: Bernie Milton PA-C and Adelaide Reyes MA    PREOPERATIVE DIAGNOSIS: basal cell carcinoma- nodular    POSTOPERATIVE DIAGNOSIS: basal cell carcinoma- nodular    PATHOLOGIC DIAGNOSIS: basal cell carcinoma- nodular    HISTOLOGY OF SPECIMENS IN FIRST STAGE:   Debulking tumor confirms nodular basal cell carcinoma.  Tumor Type: Tumor seen. Nodular basal cell carcinoma: Nodular tumor in dermis composed of basaloid cells exhibiting peripheral palisading and retraction artifact.   Depth of Invasion: subcutaneous tissue  Perineural Invasion: No    HISTOLOGY OF SPECIMENS IN SUBSEQUENT  STAGES:  Tumor Type:  No tumor seen.    STAGES OF MOHS' SURGERY PERFORMED: 2    TUMOR-FREE PLANE ACHIEVED: Yes - with deep tissue removal    HEMOSTASIS: electrocoagulation     SPECIMENS: 3 (2 in stage A and 1 in stage B)    LOCATION: nasal tip. Location verified with Dr. Daley's clinical photograph. Patient also verified location with hand held mirror.    INITIAL LESION SIZE: 0.5 x 0.5 cm    FINAL DEFECT SIZE: 0.7 x 0.7 cm    WOUND REPAIR/DISPOSITION: The patient tolerated Mohs' Micrographic Surgery for a basal cell carcinoma very well. When the tumor was completely removed, a repair of the surgical defect was undertaken.    PROCEDURE: Burow's Advancement Flap (Adjacent Tissue Transfer)    INDICATION: Status post Mohs' Micrographic Surgery for basal cell carcinoma.    CASE NUMBER:     ANESTHETIC: 0.5 cc 0.5% Lidocaine with Epi 1:200,000 mixed 1:1 with 0.5% Bupivacaine and 0.5 cc 2% Lidocaine with Epinephrine 1:100,000    SURGICAL PREP: Hibiclens, prepped by Adelaide Reyes MA    SURGEON: Jose Higgins MD    ASSISTANTS: Bernie Milton PA-C and Adelaide Reyes MA    LOCATION: nasal tip    DEFECT SIZE: 0.7 x 0.7 cm    WOUND REPAIR/DISPOSITION:  After the patient's carcinoma had been completely removed with Mohs' Micrographic Surgery, a repair of the surgical defect was undertaken. The patient was returned to the operating suite where the area of nasal tip was prepped, draped, and anesthetized in the usual sterile fashion. A Burow's advancement flap was designed in the inferior surrounding tissue of the nasal tip. A Burow's triangle was drawn in superiorly to help with tissue movement. Then with a #15 blade the flap was incised and the Burow's triangle was excised, the area was widely undermined in the submuscular tissue plane. Then, electrocoagulation was used to obtain meticulous hemostasis. A 4-0 Vicryl pexing suture was performed by attaching the underside of the most medial aspect of the flap to the nasal  "tip. The flap was then advanced in by a complex closure. Then, 4-0 Vicryl buried vertical mattress sutures were placed into the subcutaneous and dermal plane to close the wound and hunter the cutaneous wound edge. The flap was then trimmed to fit the defect. The cutaneous wound edges were closed using interrupted 5-0 Prolene sutures.    The patient tolerated the procedure well.    The area was cleaned and dressed appropriately and the patient was given wound care instructions, as well as an appointment for follow-up evaluation and suture removal in 7 days. Patient was placed on Norco 5-325 mg prn postop pain and Keflex 500 mg TID x 7 days.    SIZE OF FLAP: 4 cm squared    Vitals:    10/18/22 0736 10/18/22 1007   BP: (!) 132/91 (!) 134/93   BP Location: Left arm    Patient Position: Sitting    BP Method: Medium (Automatic)    Pulse: 79 65   Weight: 80.7 kg (178 lb)    Height: 5' 5" (1.651 m)        "

## 2022-10-24 ENCOUNTER — OFFICE VISIT (OUTPATIENT)
Dept: DERMATOLOGY | Facility: CLINIC | Age: 67
End: 2022-10-24
Payer: MEDICARE

## 2022-10-24 DIAGNOSIS — Z09 POSTOP CHECK: Primary | ICD-10-CM

## 2022-10-24 PROCEDURE — 3288F PR FALLS RISK ASSESSMENT DOCUMENTED: ICD-10-PCS | Mod: CPTII,S$GLB,, | Performed by: DERMATOLOGY

## 2022-10-24 PROCEDURE — 3288F FALL RISK ASSESSMENT DOCD: CPT | Mod: CPTII,S$GLB,, | Performed by: DERMATOLOGY

## 2022-10-24 PROCEDURE — 99999 PR PBB SHADOW E&M-EST. PATIENT-LVL II: ICD-10-PCS | Mod: PBBFAC,,, | Performed by: DERMATOLOGY

## 2022-10-24 PROCEDURE — 99024 POSTOP FOLLOW-UP VISIT: CPT | Mod: S$GLB,,, | Performed by: DERMATOLOGY

## 2022-10-24 PROCEDURE — 1126F AMNT PAIN NOTED NONE PRSNT: CPT | Mod: CPTII,S$GLB,, | Performed by: DERMATOLOGY

## 2022-10-24 PROCEDURE — 99999 PR PBB SHADOW E&M-EST. PATIENT-LVL II: CPT | Mod: PBBFAC,,, | Performed by: DERMATOLOGY

## 2022-10-24 PROCEDURE — 1101F PR PT FALLS ASSESS DOC 0-1 FALLS W/OUT INJ PAST YR: ICD-10-PCS | Mod: CPTII,S$GLB,, | Performed by: DERMATOLOGY

## 2022-10-24 PROCEDURE — 4010F PR ACE/ARB THEARPY RXD/TAKEN: ICD-10-PCS | Mod: CPTII,S$GLB,, | Performed by: DERMATOLOGY

## 2022-10-24 PROCEDURE — 1159F MED LIST DOCD IN RCRD: CPT | Mod: CPTII,S$GLB,, | Performed by: DERMATOLOGY

## 2022-10-24 PROCEDURE — 1101F PT FALLS ASSESS-DOCD LE1/YR: CPT | Mod: CPTII,S$GLB,, | Performed by: DERMATOLOGY

## 2022-10-24 PROCEDURE — 99024 PR POST-OP FOLLOW-UP VISIT: ICD-10-PCS | Mod: S$GLB,,, | Performed by: DERMATOLOGY

## 2022-10-24 PROCEDURE — 1159F PR MEDICATION LIST DOCUMENTED IN MEDICAL RECORD: ICD-10-PCS | Mod: CPTII,S$GLB,, | Performed by: DERMATOLOGY

## 2022-10-24 PROCEDURE — 4010F ACE/ARB THERAPY RXD/TAKEN: CPT | Mod: CPTII,S$GLB,, | Performed by: DERMATOLOGY

## 2022-10-24 PROCEDURE — 1126F PR PAIN SEVERITY QUANTIFIED, NO PAIN PRESENT: ICD-10-PCS | Mod: CPTII,S$GLB,, | Performed by: DERMATOLOGY

## 2022-10-24 NOTE — PROGRESS NOTES
67 y.o. female patient is here for suture removal following Mohs' surgery.    Patient reports no problems.    WOUND PE:  The nasal tip sutures intact. Wound healing well. Good skin edges. No signs or symptoms of infection.    IMPRESSION:  Healing operative site from Mohs' surgery, BCC nasal tip s/p Mohs with Burow's Advancement Flap, postop day #6.    PLAN:  Sutures removed today by  Bertha Kong MA .   Steri-strips applied.  Keep moist with Aquaphor.  Send in photo in 1 month, or come in person if needed    RTC:  In 3-6 months with  Edith Daley M.D.  for skin check or sooner if new concern arises.

## 2022-11-23 ENCOUNTER — TELEPHONE (OUTPATIENT)
Dept: DERMATOLOGY | Facility: CLINIC | Age: 67
End: 2022-11-23
Payer: MEDICARE

## 2022-11-23 NOTE — TELEPHONE ENCOUNTER
----- Message from Fifi Rome sent at 11/23/2022  1:53 PM CST -----  Regarding: pt advice  Contact: pt [phone]  Pt is calling to speak with someone in Dr. Higgins office in regards to the surgery that she had done on 10/18, pt said that she a one sutures that is coming out. Please call

## 2022-12-12 ENCOUNTER — TELEPHONE (OUTPATIENT)
Dept: DERMATOLOGY | Facility: CLINIC | Age: 67
End: 2022-12-12
Payer: MEDICARE

## 2022-12-12 NOTE — TELEPHONE ENCOUNTER
LM for pt explaining spitting sutures, using warm compresses & tweezers to remove them. Also left callback number if pt would like to schedule w/c.

## 2022-12-12 NOTE — TELEPHONE ENCOUNTER
----- Message from Domonique Plaza sent at 12/12/2022  9:18 AM CST -----  Regarding: Appointment  Contact: [phone]  Calling to speak with nurse to schedule an appointment to check stitches from procedure. Patient sutures are coming out, not allowing wound to properly heal. Please call and schedule as soon as possible.

## 2023-10-18 NOTE — LETTER
May 7, 2019    Romelia Douglass  [address] [address]             [facility]  [address], [address]  [facility] [address]  Phone: [phone]  Fax: [phone] Dear Ms. Douglass:    Your medication refill requests from your pharmacy have been sent to the provider covering Dr. Mendez's messages and currently denied because you have not been seen in over a year.    Unfortunately as of December 31st, Dr. Mendez is no longer with the [facility], she has retired from [facility].     For all future medication refill requests and any follow up, you will need to establish with a New Primary Care Provider.     Below is a list of providers who are accepting patient's to be transitioned.       [facility] (Phone: [phone])   Angele Lafleur, DO Michael Davis, MD Ryan Lee, MD Richard Imsais, MD Brian Helmstetter, DO Emily Paulk, MD [facility] (Phone: [phone])  Patricia Gendusa, MD Chris Wormuth, MD Nicolas Desalvo, MD Natalie Voithofer, MD Kristina Raveendra, MD [facility] (Phone: [phone])  Mirza Baig, MD Laura McCormick, MD Mehul Sheth, MD [facility] (Phone: [phone])  MD Luli Moe MD Jay Madrecha, MD [facility] [address] (Phone: [phone])  MD Carmen Jackson, MD Nikia Melgar, MD Kenny Cristina, MD Monique Manzano MD Kristin Johnson, MD Mary McCormick, MD Le Nguyen, MD Lisa Richards, MD James Stoll, MD Mary Yu, MD [facility] (Phone: [phone])  MD Melanie Wilson, NP    [facility] (Phone: [phone])    [facility] (Phone: [phone])     
show

## 2023-10-25 NOTE — TELEPHONE ENCOUNTER
Patient has been informed, and verbalized understanding.     Vulvar lesion exicision Vulvar lesion excision

## 2024-05-05 ENCOUNTER — HOSPITAL ENCOUNTER (EMERGENCY)
Facility: HOSPITAL | Age: 69
Discharge: HOME | End: 2024-05-05
Attending: STUDENT IN AN ORGANIZED HEALTH CARE EDUCATION/TRAINING PROGRAM
Payer: MEDICARE

## 2024-05-05 VITALS
OXYGEN SATURATION: 98 % | BODY MASS INDEX: 24.75 KG/M2 | HEART RATE: 80 BPM | TEMPERATURE: 98 F | RESPIRATION RATE: 15 BRPM | DIASTOLIC BLOOD PRESSURE: 71 MMHG | SYSTOLIC BLOOD PRESSURE: 130 MMHG | HEIGHT: 64 IN | WEIGHT: 145 LBS

## 2024-05-05 DIAGNOSIS — S00.01XA ABRASION OF SCALP, INITIAL ENCOUNTER: ICD-10-CM

## 2024-05-05 DIAGNOSIS — M25.539 WRIST PAIN: ICD-10-CM

## 2024-05-05 DIAGNOSIS — S62.102A CLOSED FRACTURE OF LEFT WRIST, INITIAL ENCOUNTER: Primary | ICD-10-CM

## 2024-05-05 PROCEDURE — 99285 EMERGENCY DEPT VISIT HI MDM: CPT | Mod: 25

## 2024-05-05 PROCEDURE — 63600175 PHARM REV CODE 636 W HCPCS: Performed by: STUDENT IN AN ORGANIZED HEALTH CARE EDUCATION/TRAINING PROGRAM

## 2024-05-05 PROCEDURE — 25000003 PHARM REV CODE 250: Performed by: STUDENT IN AN ORGANIZED HEALTH CARE EDUCATION/TRAINING PROGRAM

## 2024-05-05 PROCEDURE — 96374 THER/PROPH/DIAG INJ IV PUSH: CPT

## 2024-05-05 PROCEDURE — 25605 CLTX DST RDL FX/EPHYS SEP W/: CPT | Mod: LT

## 2024-05-05 RX ORDER — FENTANYL CITRATE 50 UG/ML
50 INJECTION, SOLUTION INTRAMUSCULAR; INTRAVENOUS
Status: COMPLETED | OUTPATIENT
Start: 2024-05-05 | End: 2024-05-05

## 2024-05-05 RX ORDER — HYDROCODONE BITARTRATE AND ACETAMINOPHEN 5; 325 MG/1; MG/1
1 TABLET ORAL EVERY 6 HOURS PRN
Qty: 12 TABLET | Refills: 0 | Status: ON HOLD | OUTPATIENT
Start: 2024-05-05 | End: 2024-05-13 | Stop reason: HOSPADM

## 2024-05-05 RX ORDER — LIDOCAINE HYDROCHLORIDE 10 MG/ML
20 INJECTION, SOLUTION EPIDURAL; INFILTRATION; INTRACAUDAL; PERINEURAL
Status: COMPLETED | OUTPATIENT
Start: 2024-05-05 | End: 2024-05-05

## 2024-05-05 RX ADMIN — LIDOCAINE HYDROCHLORIDE 200 MG: 10 INJECTION, SOLUTION EPIDURAL; INFILTRATION; INTRACAUDAL at 03:05

## 2024-05-05 RX ADMIN — FENTANYL CITRATE 50 MCG: 50 INJECTION INTRAMUSCULAR; INTRAVENOUS at 02:05

## 2024-05-05 NOTE — H&P (VIEW-ONLY)
Consult Note  Orthopedic Surgery    CC: left wrist injury    SUBJECTIVE:     History of Present Illness:  Romelia Douglass is a right hand dominant 68 y.o. female with PMH of HTN, HLD, Hypothyroidism presenting with left wrist pain. Patient fell while coming down from a ladder. They experienced immediate pain and inability to bear weight with that hand. Denies head injury or LOC. They take no anticoagulation at home. Denies any prior injuries or surgeries to this wrist. Denies other MSK pains. Denies numbness, tingling, or paresthesias to extremity.      Review of patient's allergies indicates:  No Known Allergies    Past Medical History:   Diagnosis Date    Basal cell carcinoma of nasal tip 10/18/2022    Cancer     skin cancer     HLD (hyperlipidemia)     HTN (hypertension)     Hypothyroidism     Insulin resistance      Past Surgical History:   Procedure Laterality Date    ANKLE SURGERY      left ankle surgery/benign cysts     BREAST SURGERY  1974    reduction    COLONOSCOPY N/A 10/14/2015    Procedure: COLONOSCOPY;  Surgeon: Juanjo Yusuf MD;  Location: [facility] ([address] [place] [address]);  Service: Endoscopy;  Laterality: N/A;    SKIN CANCER EXCISION Left 10/2017    ? type     Family History   Problem Relation Name Age of Onset    Hyperlipidemia Mother 81     Stroke Mother 81     Dementia Mother 81     COPD Mother 81     Alcohol abuse Brother          drug and alcohol    Drug abuse Brother      Dementia Father 84         el body dementia    Parkinsonism Father 84     No Known Problems Brother      No Known Problems Brother      Cancer Maternal Aunt moms twin 40        breast cancer and nonhodgkins lymphoma     Social History     Tobacco Use    Smoking status: Former     Current packs/day: 0.00     Types: Cigarettes     Quit date: 2014     Years since quittin.9   Substance Use Topics    Alcohol use: Yes     Comment: rare    Drug use: No        Review of Systems:  Constitutional: Negative for fevers and chills  HENT:  Negative for congestion and headaches   Eyes: Negative for blurred vision   Cardiovascular: Negative for chest pain and palpitations  Respiratory: Negative for cough and shortness of breath   Hematologic/Lymphatic: Negative for bleeding problem. Does not bruise/bleed easily  Skin: Negative for dry skin and rash  Musculoskeletal: Positive for wrist pain; negative for back pain  Gastrointestinal: Negative for abdominal pain and n/v/d  Neurological: Negative for numbness and paresthesias     OBJECTIVE:     Vital Signs:  Temp:  [98.3 °F (36.8 °C)] 98.3 °F (36.8 °C)  Pulse:  [85] 85  Resp:  [16] 16  SpO2:  [97 %] 97 %  BP: (137)/(77) 137/77    Physical Exam:  Constitutional: NAD; well-developed and well-nourished  HEENT: NC/AT  Neck: supple; no C-spine tenderness  Skin: Warm and dry; no rashes   Neuro: Alert and oriented to person, place, and time; no focal numbness or weakness    MSK  LUE:  Skin intact throughout, no scars present  moderate swelling over the dorsum of the wrist extending distally into the fingers  Dorsal deformity at the distal forearm  No ecchymosis, erythema, or signs of cellulitis  TTP at distal forearm and wrist  No tenderness to palpation of proximal, middle, or distal aspects of humerus  No tenderness to palpation of proximal or middle aspects of radius or ulna  No tenderness to palpation of hand  Limited ROM at wrist and with forearm rotation  Full painless ROM at shoulder, elbow, and fingers  Compartments soft  SILT M/U/R  Motor intact AIN/PIN/M/U/R  No evidence of tendon injury  2+ radial and ulnar pulses  Brisk capillary refill    Diagnostic Results:  X-rays of the left wrist show a comminuted fracture of the distal radius.  Hand and elbow x-rays are negative for additional fracture or dislocation    Procedure Note:  After time out was performed and patient ID, side, and site were verified, the left wrist and forearm was sterilly prepped in the standard fashion. A 21-gauge needle was  introduced into the fracture site without complication. Ten mL's of 1% lidocaine was then injected to the fracture site without difficulty. The patient was placed in finger traps while the anesthetic took effect. After adequate analgesia, the fracture was closed reduced under C-arm guidance and adequate reduction was obtained. A sugar tong splint was applied and then post-reduction films were obtained which verified maintenance of the reduction. The patient tolerated the procedure well with no complications.     ASSESSMENT/PLAN:     1. Closed Left distal radius fracture  Romelia Douglass is a 68 y.o. female with left distal radius fracture    - Reduced and splinted in ER  - Will likely require operative treatment  - [facility] with [facility] in 1 week (patient will be contacted with appointment details)   - CORNELIO VYAS  - Patient educated on the signs and symptoms of Compartment Syndrome and Acute Carpal Tunnel Syndrome and instructed to return to the hospital immediately if these symptoms arise        Coy Nichols MD  Orthopaedic Surgery Resident  05/05/2024

## 2024-05-05 NOTE — DISCHARGE INSTRUCTIONS
You were diagnosed today with a left wrist fracture.  Please follow-up with orthopedics within 1 week.  A referral has been placed for you today.  If you do not receive a call to schedule follow up appointment in the next 48 hours, you can call the number listed in this discharge paperwork.  You have been prescribed Norco to help with your pain.  Please do not drive or operate machinery while taking this medication as it can cause drowsiness.  You can also take ibuprofen.  You should continue to rest and elevate the wrist to help with swelling and pain.  Please keep the splint clean and dry.  If you develop any pain associated with the splint, please return to the emergency department so that we can wrist splint the wrist.  If you develop any worsening pain, fever, concerns, please return to the emergency department for re-evaluation.

## 2024-05-05 NOTE — ED NOTES
Pt to ED after a trip and fall. Pt states she fell backwards and hit the back of her head and then fell onto left arm. Obvious deformity in left wrist. Pt able to wriggle fingers and sensation intact. Small knot noted to back of head. Pt has pain in wrist but denies head pain.     LOC: The patient is awake, alert and aware of environment with an appropriate affect, the patient is oriented x 3 and speaking appropriately.  APPEARANCE: Patient resting comfortably and in no acute distress, patient is clean and well groomed, patient's clothing is properly fastened.  SKIN: The skin is warm and dry, color consistent with ethnicity, patient has normal skin turgor and moist mucus membranes, skin intact, no breakdown or bruising noted.  MUSCULOSKELETAL: Patient moving all extremities spontaneously, deformity to left wrist.  RESPIRATORY: Airway is open and patent, respirations are spontaneous, patient has a normal effort and rate, no accessory muscle use noted.  CARDIAC: Patient has a normal rate and regular rhythm, no periphreal edema noted, capillary refill < 3 seconds.  ABDOMEN: Soft and non tender to palpation, no distention noted, normoactive bowel sounds present in all four quadrants.  NEUROLOGIC:  facial expression is symmetrical, patient moving all extremities spontaneously, normal sensation in all extremities when touched with a finger.  Follows all commands appropriately.

## 2024-05-05 NOTE — CONSULTS
Consult Note  Orthopedic Surgery    CC: left wrist injury    SUBJECTIVE:     History of Present Illness:  Romelia Douglass is a right hand dominant 68 y.o. female with PMH of HTN, HLD, Hypothyroidism presenting with left wrist pain. Patient fell while coming down from a ladder. They experienced immediate pain and inability to bear weight with that hand. Denies head injury or LOC. They take no anticoagulation at home. Denies any prior injuries or surgeries to this wrist. Denies other MSK pains. Denies numbness, tingling, or paresthesias to extremity.      Review of patient's allergies indicates:  No Known Allergies    Past Medical History:   Diagnosis Date    Basal cell carcinoma of nasal tip 10/18/2022    Cancer     skin cancer     HLD (hyperlipidemia)     HTN (hypertension)     Hypothyroidism     Insulin resistance      Past Surgical History:   Procedure Laterality Date    ANKLE SURGERY      left ankle surgery/benign cysts     BREAST SURGERY  1974    reduction    COLONOSCOPY N/A 10/14/2015    Procedure: COLONOSCOPY;  Surgeon: Juanjo Yusuf MD;  Location: [facility] ([address] [place] [address]);  Service: Endoscopy;  Laterality: N/A;    SKIN CANCER EXCISION Left 10/2017    ? type     Family History   Problem Relation Name Age of Onset    Hyperlipidemia Mother 81     Stroke Mother 81     Dementia Mother 81     COPD Mother 81     Alcohol abuse Brother          drug and alcohol    Drug abuse Brother      Dementia Father 84         el body dementia    Parkinsonism Father 84     No Known Problems Brother      No Known Problems Brother      Cancer Maternal Aunt moms twin 40        breast cancer and nonhodgkins lymphoma     Social History     Tobacco Use    Smoking status: Former     Current packs/day: 0.00     Types: Cigarettes     Quit date: 2014     Years since quittin.9   Substance Use Topics    Alcohol use: Yes     Comment: rare    Drug use: No        Review of Systems:  Constitutional: Negative for fevers and chills  HENT:  Negative for congestion and headaches   Eyes: Negative for blurred vision   Cardiovascular: Negative for chest pain and palpitations  Respiratory: Negative for cough and shortness of breath   Hematologic/Lymphatic: Negative for bleeding problem. Does not bruise/bleed easily  Skin: Negative for dry skin and rash  Musculoskeletal: Positive for wrist pain; negative for back pain  Gastrointestinal: Negative for abdominal pain and n/v/d  Neurological: Negative for numbness and paresthesias     OBJECTIVE:     Vital Signs:  Temp:  [98.3 °F (36.8 °C)] 98.3 °F (36.8 °C)  Pulse:  [85] 85  Resp:  [16] 16  SpO2:  [97 %] 97 %  BP: (137)/(77) 137/77    Physical Exam:  Constitutional: NAD; well-developed and well-nourished  HEENT: NC/AT  Neck: supple; no C-spine tenderness  Skin: Warm and dry; no rashes   Neuro: Alert and oriented to person, place, and time; no focal numbness or weakness    MSK  LUE:  Skin intact throughout, no scars present  moderate swelling over the dorsum of the wrist extending distally into the fingers  Dorsal deformity at the distal forearm  No ecchymosis, erythema, or signs of cellulitis  TTP at distal forearm and wrist  No tenderness to palpation of proximal, middle, or distal aspects of humerus  No tenderness to palpation of proximal or middle aspects of radius or ulna  No tenderness to palpation of hand  Limited ROM at wrist and with forearm rotation  Full painless ROM at shoulder, elbow, and fingers  Compartments soft  SILT M/U/R  Motor intact AIN/PIN/M/U/R  No evidence of tendon injury  2+ radial and ulnar pulses  Brisk capillary refill    Diagnostic Results:  X-rays of the left wrist show a comminuted fracture of the distal radius.  Hand and elbow x-rays are negative for additional fracture or dislocation    Procedure Note:  After time out was performed and patient ID, side, and site were verified, the left wrist and forearm was sterilly prepped in the standard fashion. A 21-gauge needle was  introduced into the fracture site without complication. Ten mL's of 1% lidocaine was then injected to the fracture site without difficulty. The patient was placed in finger traps while the anesthetic took effect. After adequate analgesia, the fracture was closed reduced under C-arm guidance and adequate reduction was obtained. A sugar tong splint was applied and then post-reduction films were obtained which verified maintenance of the reduction. The patient tolerated the procedure well with no complications.     ASSESSMENT/PLAN:     1. Closed Left distal radius fracture  Romelia Douglass is a 68 y.o. female with left distal radius fracture    - Reduced and splinted in ER  - Will likely require operative treatment  - Follow-up with [facility] in 1 week (patient will be contacted with appointment details)   - CORNELIO VYAS  - Patient educated on the signs and symptoms of Compartment Syndrome and Acute Carpal Tunnel Syndrome and instructed to return to the hospital immediately if these symptoms arise        Coy Nichols MD  Orthopaedic Surgery Resident  05/05/2024

## 2024-05-05 NOTE — ED PROVIDER NOTES
Encounter Date: 5/5/2024       History     Chief Complaint   Patient presents with    Wrist Pain     Tripped and fell. Left wrist pain and deformity. Seen at Urgent care. X ray confirmed fracture. Pt sent to ED without splint nor immobilization     HPI  68-year-old female with history of hypothyroidism, hypertension, hyperlipidemia who presents for left wrist injury.  Patient takes aspirin but is not on any other anticoagulation.  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  She does state that she bumped her head on a table on wheels when she fell backwards but did not hit her head on the ground.  She states that she had a small cut on the back of her head where the bleeding stopped.  She denies headache, vision changes, extremity weakness or numbness.  She denies neck pain or back pain.  She is only reporting left wrist discomfort and swelling.  Pain is worse with movement or manipulation.  She went to a clinic by her house and had x-rays and she was told she had a left wrist fracture and was told to come to the emergency department.  Patient was not taken anything for pain at home but she has been using ice.  Patient is right-hand dominant.    Review of patient's allergies indicates:  No Known Allergies  Past Medical History:   Diagnosis Date    Basal cell carcinoma of nasal tip 10/18/2022    Cancer     skin cancer     HLD (hyperlipidemia)     HTN (hypertension)     Hypothyroidism     Insulin resistance      Past Surgical History:   Procedure Laterality Date    ANKLE SURGERY      left ankle surgery/benign cysts     BREAST SURGERY  1974    reduction    COLONOSCOPY N/A 10/14/2015    Procedure: COLONOSCOPY;  Surgeon: Juanjo Yusuf MD;  Location: [facility] ([address] [place] [address]);  Service: Endoscopy;  Laterality: N/A;    SKIN CANCER EXCISION Left 10/2017    ? type     Family  History   Problem Relation Name Age of Onset    Hyperlipidemia Mother 81     Stroke Mother 81     Dementia Mother 81     COPD Mother 81     Alcohol abuse Brother          drug and alcohol    Drug abuse Brother      Dementia Father 84         el body dementia    Parkinsonism Father 84     No Known Problems Brother      No Known Problems Brother      Cancer Maternal Aunt moms twin 40        breast cancer and nonhodgkins lymphoma     Social History     Tobacco Use    Smoking status: Former     Current packs/day: 0.00     Types: Cigarettes     Quit date: 2014     Years since quittin.9   Substance Use Topics    Alcohol use: Yes     Comment: rare    Drug use: No     Review of Systems  A full review of systems was obtained, see HPI for pertinent positives.    Physical Exam     Initial Vitals [24 1409]   BP Pulse Resp Temp SpO2   137/77 85 16 98.3 °F (36.8 °C) 97 %      MAP       --         Physical Exam  Constitutional: No acute distress, well-appearing, nontoxic  HENT: Normocephalic, small less than 0.5 cm superficial cut to the occiput with bleeding controlled, no raccoon eyes or arias sign, nares patent, moist mucous membranes, oropharynx benign  Eyes: PERRL, EOMI  Spine: No C/T/L-spine tenderness, no step offs or deformities, neck is supple with normal range of motion  Respiratory:  Non-labored  Cardiovascular: Regular rate and rhythm, intact distal pulses in all extremities  Gastrointestinal: Soft, non-tender, non-distended  Pelvis: Stable  Musculoskeletal:  LUE - left wrist deformity present, 2+ radial pulse, neurovascularly intact distally, shoulder and elbow with normal range of motion, range of motion limited at the wrist due to swelling and discomfort, range of motion normal at all these small joints in the hand, compartments soft, RUE and bilateral lower extremities with normal range of motion all joints, neurovascularly intact distally with soft compartments  Integumentary: Warm and  dry  Neurological: Awake and alert, follows commands in all extremities, 5/5 motor and sensation light touch intact in all extremities, cranial nerves 2-12 grossly intact     ED Course   Procedures  Labs Reviewed - No data to display       Imaging Results              X-Ray Wrist 2 View Left (In process)                      CT Head Without Contrast (Final result)  Result time 05/05/24 17:29:11      Final result by Conrado Santiago MD (05/05/24 17:29:11)                   Impression:      No acute intracranial hemorrhage/injury      Electronically signed by: Conrado Santiago  Date:    05/05/2024  Time:    17:29               Narrative:    EXAMINATION:  CT HEAD WITHOUT CONTRAST    CLINICAL HISTORY:  Head trauma, minor (Age >= 65y); patient hit head on table.    TECHNIQUE:  Low dose axial images were obtained through the head.  Coronal and sagittal reformations were also performed. Contrast was not administered.    COMPARISON:  MRI 07/27/2021    FINDINGS:  There is no evidence of intracranial hemorrhage or parenchymal contusion.    No hydrocephalus mass effect or acute territorial infarct.    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    The calvarium is intact.    The visualized paranasal sinuses and mastoid air cells are essentially clear.    Mild scattered atherosclerotic vascular calcifications at the skull base.                                       CT Wrist Without Contrast Left (In process)                      X-Ray Forearm Left (Final result)  Result time 05/05/24 15:53:32      Final result by Ruperto Hunt MD (05/05/24 15:53:32)                   Impression:      Distal radial and suspected ulnar styloid base acute fractures, as above.    No definite displaced fracture-dislocation of the wrist or hand.      Electronically signed by: Ruperto Hunt MD  Date:    05/05/2024  Time:    15:53               Narrative:    EXAMINATION:  XR HAND COMPLETE 3 VIEW LEFT; XR WRIST  COMPLETE 3 VIEWS LEFT; XR FOREARM LEFT    CLINICAL HISTORY:  wrist pain;. Pain in unspecified wrist    TECHNIQUE:  PA, lateral, and oblique views of the left hand and left wrist; AP and lateral views left forearm    COMPARISON:  None    FINDINGS:  Acute fracture involving the distal radial metaphysis and head with mild comminution, displacement and ventral apex angulation.  The carpus maintains alignment with the distal radius.  Fracture planes appear to extend to the articular surface at the radial head.    Suspected acute fracture with minimal displacement involving the ulnar styloid base.    No definite carpal or hand fracture seen.    There is soft tissue swelling about the distal forearm and wrist.  Baseline minimal DJD about the wrist and hand.  No subcutaneous emphysema or radiodense retained foreign body.                                       X-Ray Wrist Complete Left (Final result)  Result time 05/05/24 15:53:32      Final result by Ruperto Hunt MD (05/05/24 15:53:32)                   Impression:      Distal radial and suspected ulnar styloid base acute fractures, as above.    No definite displaced fracture-dislocation of the wrist or hand.      Electronically signed by: Ruperto Hunt MD  Date:    05/05/2024  Time:    15:53               Narrative:    EXAMINATION:  XR HAND COMPLETE 3 VIEW LEFT; XR WRIST COMPLETE 3 VIEWS LEFT; XR FOREARM LEFT    CLINICAL HISTORY:  wrist pain;. Pain in unspecified wrist    TECHNIQUE:  PA, lateral, and oblique views of the left hand and left wrist; AP and lateral views left forearm    COMPARISON:  None    FINDINGS:  Acute fracture involving the distal radial metaphysis and head with mild comminution, displacement and ventral apex angulation.  The carpus maintains alignment with the distal radius.  Fracture planes appear to extend to the articular surface at the radial head.    Suspected acute fracture with minimal displacement involving the ulnar styloid base.    No  definite carpal or hand fracture seen.    There is soft tissue swelling about the distal forearm and wrist.  Baseline minimal DJD about the wrist and hand.  No subcutaneous emphysema or radiodense retained foreign body.                                       X-Ray Hand 3 View Left (Final result)  Result time 05/05/24 15:53:32      Final result by Ruperto Hunt MD (05/05/24 15:53:32)                   Impression:      Distal radial and suspected ulnar styloid base acute fractures, as above.    No definite displaced fracture-dislocation of the wrist or hand.      Electronically signed by: Ruperto Hunt MD  Date:    05/05/2024  Time:    15:53               Narrative:    EXAMINATION:  XR HAND COMPLETE 3 VIEW LEFT; XR WRIST COMPLETE 3 VIEWS LEFT; XR FOREARM LEFT    CLINICAL HISTORY:  wrist pain;. Pain in unspecified wrist    TECHNIQUE:  PA, lateral, and oblique views of the left hand and left wrist; AP and lateral views left forearm    COMPARISON:  None    FINDINGS:  Acute fracture involving the distal radial metaphysis and head with mild comminution, displacement and ventral apex angulation.  The carpus maintains alignment with the distal radius.  Fracture planes appear to extend to the articular surface at the radial head.    Suspected acute fracture with minimal displacement involving the ulnar styloid base.    No definite carpal or hand fracture seen.    There is soft tissue swelling about the distal forearm and wrist.  Baseline minimal DJD about the wrist and hand.  No subcutaneous emphysema or radiodense retained foreign body.                                       Medications   Tdap (BOOSTRIX) vaccine injection 0.5 mL (0.5 mLs Intramuscular Not Given 5/5/24 9893)   fentaNYL 50 mcg/mL injection 50 mcg (50 mcg Intravenous Given 5/5/24 1104)   LIDOcaine (PF) 10 mg/ml (1%) injection 200 mg (200 mg Infiltration Given by Provider 5/5/24 1518)     Medical Decision Making  Patient is a very pleasant 68-year-old female who  presents for evaluation of a left wrist injury.  Patient was climbing down a step ladder and missed the very bottom rung and fell backwards onto her left wrist.  She did hit her head on a table that was positioned behind her but states that she was not had any headache, vision changes, extremity weakness or numbness, nausea or vomiting.  She has no neck pain.  She denies hitting her head hard but states that the table had a sharp lead that she obtain a small laceration from.  The laceration is superficial.  Bleeding is controlled.  No need for repair.  Will update her tetanus.  Will obtain head imaging.  C-spine cleared using Dickson c-spine rule.  X-ray of the left wrist ordered.  Patient reportedly had outpatient imaging that showed an acute fracture.  Plan to discuss with ortho after imaging completed.  Will provide pain control here.  Patient provided sling for comfort.  She was counseled on exercises to prevent frozen shoulder.    Imaging reviewed.  Patient has evidence of left wrist fracture.  Orthopedics was consulted and able to splint the wrist.  CT was obtained for preoperative planning.  Final reads on postreduction x-ray and CT were still pending at time of discharge but orthopedics is already review them and feel she was stable for discharge.  Head CT was negative.  Patient was ambulating and her pain is controlled.  Will send home with short course of Norco for pain and she was counseled on rice therapy for home.  She was also counseled on splint care as well as return precautions.    Amount and/or Complexity of Data Reviewed  Radiology: ordered.    Risk  Prescription drug management.               ED Course as of 05/05/24 1740   Sun May 05, 2024   1556 Impression:     Distal radial and suspected ulnar styloid base acute fractures, as above.     No definite displaced fracture-dislocation of the wrist or hand.   [NN]   1734 CT head negative [NN]   1736 Ortho was able to splint the patient.  They  reviewed the patient's repeat wrist film and feel she was stable for discharge home.  They would like to see her in clinic as follow up. [NN]      ED Course User Index  [NN] Rivka Chao MD                           Clinical Impression:  Final diagnoses:  [M25.539] Wrist pain  [S62.102A] Closed fracture of left wrist, initial encounter (Primary)  [S00.01XA] Abrasion of scalp, initial encounter          ED Disposition Condition    Discharge Stable          ED Prescriptions       Medication Sig Dispense Start Date End Date Auth. Provider    HYDROcodone-acetaminophen (NORCO) 5-325 mg per tablet Take 1 tablet by mouth every 6 (six) hours as needed for Pain. 12 tablet 5/5/2024 -- Rivka Chao MD          Follow-up Information       Follow up With Specialties Details Why Contact Info Additional Information    [facility] - [facility] Emergency Medicine  As needed, If symptoms worsen [address] [facility] [id number] [phone]     [facility] Schedule an appointment as soon as possible for a visit   [address] [facility] [phone] [facility] - Main Building, 5th Floor Please park in [facility] and take Atrium elevator             Rivka Chao MD  05/05/24 2331       Rivka Chao MD  05/05/24 9686

## 2024-05-06 ENCOUNTER — TELEPHONE (OUTPATIENT)
Dept: ORTHOPEDICS | Facility: CLINIC | Age: 69
End: 2024-05-06
Payer: MEDICARE

## 2024-05-08 DIAGNOSIS — S52.502A CLOSED FRACTURE OF LEFT DISTAL RADIUS: ICD-10-CM

## 2024-05-08 DIAGNOSIS — S52.502A CLOSED FRACTURE OF DISTAL END OF LEFT RADIUS, UNSPECIFIED FRACTURE MORPHOLOGY, INITIAL ENCOUNTER: Primary | ICD-10-CM

## 2024-05-08 RX ORDER — CEFAZOLIN SODIUM 2 G/50ML
2 SOLUTION INTRAVENOUS
Status: CANCELLED | OUTPATIENT
Start: 2024-05-08

## 2024-05-08 RX ORDER — MUPIROCIN 20 MG/G
OINTMENT TOPICAL
Status: CANCELLED | OUTPATIENT
Start: 2024-05-08

## 2024-05-10 ENCOUNTER — TELEPHONE (OUTPATIENT)
Dept: ORTHOPEDICS | Facility: CLINIC | Age: 69
End: 2024-05-10
Payer: MEDICARE

## 2024-05-10 ENCOUNTER — ANESTHESIA EVENT (OUTPATIENT)
Dept: SURGERY | Facility: HOSPITAL | Age: 69
End: 2024-05-10
Payer: MEDICARE

## 2024-05-10 ENCOUNTER — PATIENT MESSAGE (OUTPATIENT)
Dept: ORTHOPEDICS | Facility: CLINIC | Age: 69
End: 2024-05-10
Payer: MEDICARE

## 2024-05-10 NOTE — TELEPHONE ENCOUNTER
Spoke to pt gave her surgery  instructions and arrival time. Also sent my chart message . Went through pt iq questionnaire  and capture answers verbally . Set pt on [employer] to receive any updates about surgery.

## 2024-05-10 NOTE — TELEPHONE ENCOUNTER
Spoke with Dr Silveira . Pt did not show up for her skin check and to sign consents. Pt will sign consents the morning of surgery .

## 2024-05-10 NOTE — TELEPHONE ENCOUNTER
Spoke with pt advise pt that her arrival time is for 5am on Monday 5/13/2024 not 7am. Pt verbalize understands

## 2024-05-12 NOTE — ANESTHESIA PREPROCEDURE EVALUATION
05/12/2024  Romelia Douglass is a 68 y.o., female.      Pre-op Assessment    I have reviewed the Patient Summary Reports.       I have reviewed the Medications.     Review of Systems  Anesthesia Hx:  No problems with previous Anesthesia               Denies Personal Hx of Anesthesia complications.                    Cardiovascular:     Hypertension                                  Hypertension         Endocrine:   Hypothyroidism       Hypothyroidism                 Anesthesia Plan  Type of Anesthesia, risks & benefits discussed:    Anesthesia Type: Gen ETT  Informed Consent: Informed consent signed with the Patient and all parties understand the risks and agree with anesthesia plan.  All questions answered.   ASA Score: 3  Anesthesia Plan Notes: Chart reviewed. Patient seen and examined. Anesthesia plan discussed and questions answered. E-consent signed. Ryan Anaya MD    Ready For Surgery From Anesthesia Perspective.     .

## 2024-05-13 ENCOUNTER — NURSE TRIAGE (OUTPATIENT)
Dept: ADMINISTRATIVE | Facility: CLINIC | Age: 69
End: 2024-05-13
Payer: MEDICARE

## 2024-05-13 ENCOUNTER — HOSPITAL ENCOUNTER (OUTPATIENT)
Facility: HOSPITAL | Age: 69
Discharge: HOME | End: 2024-05-13
Attending: ORTHOPAEDIC SURGERY | Admitting: ORTHOPAEDIC SURGERY
Payer: MEDICARE

## 2024-05-13 ENCOUNTER — ANESTHESIA (OUTPATIENT)
Dept: SURGERY | Facility: HOSPITAL | Age: 69
End: 2024-05-13
Payer: MEDICARE

## 2024-05-13 VITALS
HEART RATE: 72 BPM | OXYGEN SATURATION: 96 % | TEMPERATURE: 98 F | SYSTOLIC BLOOD PRESSURE: 160 MMHG | WEIGHT: 160 LBS | RESPIRATION RATE: 20 BRPM | HEIGHT: 64 IN | BODY MASS INDEX: 27.31 KG/M2 | DIASTOLIC BLOOD PRESSURE: 84 MMHG

## 2024-05-13 DIAGNOSIS — S52.502A CLOSED FRACTURE OF DISTAL END OF LEFT RADIUS, UNSPECIFIED FRACTURE MORPHOLOGY, INITIAL ENCOUNTER: ICD-10-CM

## 2024-05-13 DIAGNOSIS — S52.502A CLOSED FRACTURE OF LEFT DISTAL RADIUS: Primary | ICD-10-CM

## 2024-05-13 PROCEDURE — 71000044 HC DOSC ROUTINE RECOVERY FIRST HOUR: Performed by: ORTHOPAEDIC SURGERY

## 2024-05-13 PROCEDURE — 63600175 PHARM REV CODE 636 W HCPCS

## 2024-05-13 PROCEDURE — 36000711: Performed by: ORTHOPAEDIC SURGERY

## 2024-05-13 PROCEDURE — 36000710: Performed by: ORTHOPAEDIC SURGERY

## 2024-05-13 PROCEDURE — C1713 ANCHOR/SCREW BN/BN,TIS/BN: HCPCS | Performed by: ORTHOPAEDIC SURGERY

## 2024-05-13 PROCEDURE — D9220A PRA ANESTHESIA: Mod: ANES,,, | Performed by: ANESTHESIOLOGY

## 2024-05-13 PROCEDURE — 25607 OPTX DST RD XARTC FX/EPI SEP: CPT | Mod: LT,,, | Performed by: ORTHOPAEDIC SURGERY

## 2024-05-13 PROCEDURE — 64415 NJX AA&/STRD BRCH PLXS IMG: CPT

## 2024-05-13 PROCEDURE — 63600175 PHARM REV CODE 636 W HCPCS: Performed by: ORTHOPAEDIC SURGERY

## 2024-05-13 PROCEDURE — C1769 GUIDE WIRE: HCPCS | Performed by: ORTHOPAEDIC SURGERY

## 2024-05-13 PROCEDURE — 25000003 PHARM REV CODE 250: Performed by: PHYSICIAN ASSISTANT

## 2024-05-13 PROCEDURE — 27201423 OPTIME MED/SURG SUP & DEVICES STERILE SUPPLY: Performed by: ORTHOPAEDIC SURGERY

## 2024-05-13 PROCEDURE — D9220A PRA ANESTHESIA: Mod: CRNA,,, | Performed by: NURSE ANESTHETIST, CERTIFIED REGISTERED

## 2024-05-13 PROCEDURE — 63600175 PHARM REV CODE 636 W HCPCS: Performed by: PHYSICIAN ASSISTANT

## 2024-05-13 PROCEDURE — 63600175 PHARM REV CODE 636 W HCPCS: Mod: JZ,JG | Performed by: ANESTHESIOLOGY

## 2024-05-13 PROCEDURE — 64415 NJX AA&/STRD BRCH PLXS IMG: CPT | Mod: 59,LT,, | Performed by: ANESTHESIOLOGY

## 2024-05-13 PROCEDURE — 63600175 PHARM REV CODE 636 W HCPCS: Performed by: NURSE ANESTHETIST, CERTIFIED REGISTERED

## 2024-05-13 PROCEDURE — 25000003 PHARM REV CODE 250: Performed by: NURSE ANESTHETIST, CERTIFIED REGISTERED

## 2024-05-13 PROCEDURE — 37000009 HC ANESTHESIA EA ADD 15 MINS: Performed by: ORTHOPAEDIC SURGERY

## 2024-05-13 PROCEDURE — 37000008 HC ANESTHESIA 1ST 15 MINUTES: Performed by: ORTHOPAEDIC SURGERY

## 2024-05-13 PROCEDURE — 71000015 HC POSTOP RECOV 1ST HR: Performed by: ORTHOPAEDIC SURGERY

## 2024-05-13 DEVICE — SCREW LOCKING 2.4 X 16MM: Type: IMPLANTABLE DEVICE | Site: WRIST | Status: FUNCTIONAL

## 2024-05-13 DEVICE — SCREW VA LOCK STARDRIVE 2.4X12: Type: IMPLANTABLE DEVICE | Site: WRIST | Status: FUNCTIONAL

## 2024-05-13 DEVICE — SCREW STRDRV REC T8 2.7X14 SS: Type: IMPLANTABLE DEVICE | Site: WRIST | Status: FUNCTIONAL

## 2024-05-13 DEVICE — SCREW STRDRV REC T8 2.7X12 SS: Type: IMPLANTABLE DEVICE | Site: WRIST | Status: FUNCTIONAL

## 2024-05-13 DEVICE — SCREW STRDRV REC T8 2.4X24 SS: Type: IMPLANTABLE DEVICE | Site: WRIST | Status: FUNCTIONAL

## 2024-05-13 DEVICE — PLATE RAD DIST VA-LCP 2.4MM: Type: IMPLANTABLE DEVICE | Site: WRIST | Status: FUNCTIONAL

## 2024-05-13 DEVICE — SCREW LOCKING 2.4 X 14MM: Type: IMPLANTABLE DEVICE | Site: WRIST | Status: FUNCTIONAL

## 2024-05-13 RX ORDER — DEXAMETHASONE SODIUM PHOSPHATE 4 MG/ML
INJECTION, SOLUTION INTRA-ARTICULAR; INTRALESIONAL; INTRAMUSCULAR; INTRAVENOUS; SOFT TISSUE
Status: DISCONTINUED | OUTPATIENT
Start: 2024-05-13 | End: 2024-05-13

## 2024-05-13 RX ORDER — FENTANYL CITRATE 50 UG/ML
25 INJECTION, SOLUTION INTRAMUSCULAR; INTRAVENOUS EVERY 5 MIN PRN
Status: DISCONTINUED | OUTPATIENT
Start: 2024-05-13 | End: 2024-05-13 | Stop reason: HOSPADM

## 2024-05-13 RX ORDER — PROPOFOL 10 MG/ML
VIAL (ML) INTRAVENOUS
Status: DISCONTINUED | OUTPATIENT
Start: 2024-05-13 | End: 2024-05-13

## 2024-05-13 RX ORDER — MUPIROCIN 20 MG/G
OINTMENT TOPICAL
Status: DISCONTINUED | OUTPATIENT
Start: 2024-05-13 | End: 2024-05-13 | Stop reason: HOSPADM

## 2024-05-13 RX ORDER — DEXTROMETHORPHAN HYDROBROMIDE, GUAIFENESIN 5; 100 MG/5ML; MG/5ML
650 LIQUID ORAL EVERY 8 HOURS
Qty: 42 TABLET | Refills: 0 | Status: SHIPPED | OUTPATIENT
Start: 2024-05-13 | End: 2024-05-27

## 2024-05-13 RX ORDER — DIPHENHYDRAMINE HYDROCHLORIDE 50 MG/ML
25 INJECTION INTRAMUSCULAR; INTRAVENOUS EVERY 6 HOURS PRN
Status: DISCONTINUED | OUTPATIENT
Start: 2024-05-13 | End: 2024-05-13 | Stop reason: HOSPADM

## 2024-05-13 RX ORDER — LIDOCAINE HYDROCHLORIDE 20 MG/ML
INJECTION INTRAVENOUS
Status: DISCONTINUED | OUTPATIENT
Start: 2024-05-13 | End: 2024-05-13

## 2024-05-13 RX ORDER — HYDROMORPHONE HYDROCHLORIDE 1 MG/ML
0.2 INJECTION, SOLUTION INTRAMUSCULAR; INTRAVENOUS; SUBCUTANEOUS EVERY 5 MIN PRN
Status: DISCONTINUED | OUTPATIENT
Start: 2024-05-13 | End: 2024-05-13 | Stop reason: HOSPADM

## 2024-05-13 RX ORDER — VANCOMYCIN HYDROCHLORIDE 1 G/20ML
INJECTION, POWDER, LYOPHILIZED, FOR SOLUTION INTRAVENOUS
Status: DISCONTINUED | OUTPATIENT
Start: 2024-05-13 | End: 2024-05-13 | Stop reason: HOSPADM

## 2024-05-13 RX ORDER — MIDAZOLAM HYDROCHLORIDE 1 MG/ML
.5-4 INJECTION, SOLUTION INTRAMUSCULAR; INTRAVENOUS
Status: DISCONTINUED | OUTPATIENT
Start: 2024-05-13 | End: 2024-05-13 | Stop reason: HOSPADM

## 2024-05-13 RX ORDER — BUPIVACAINE HYDROCHLORIDE 7.5 MG/ML
INJECTION, SOLUTION EPIDURAL; RETROBULBAR
Status: COMPLETED | OUTPATIENT
Start: 2024-05-13 | End: 2024-05-13

## 2024-05-13 RX ORDER — ONDANSETRON HYDROCHLORIDE 2 MG/ML
INJECTION, SOLUTION INTRAVENOUS
Status: DISCONTINUED | OUTPATIENT
Start: 2024-05-13 | End: 2024-05-13

## 2024-05-13 RX ORDER — FENTANYL CITRATE 50 UG/ML
INJECTION, SOLUTION INTRAMUSCULAR; INTRAVENOUS
Status: DISCONTINUED | OUTPATIENT
Start: 2024-05-13 | End: 2024-05-13

## 2024-05-13 RX ORDER — CEFAZOLIN SODIUM 1 G/3ML
INJECTION, POWDER, FOR SOLUTION INTRAMUSCULAR; INTRAVENOUS
Status: DISCONTINUED | OUTPATIENT
Start: 2024-05-13 | End: 2024-05-13

## 2024-05-13 RX ORDER — ONDANSETRON HYDROCHLORIDE 2 MG/ML
4 INJECTION, SOLUTION INTRAVENOUS ONCE AS NEEDED
Status: DISCONTINUED | OUTPATIENT
Start: 2024-05-13 | End: 2024-05-13 | Stop reason: HOSPADM

## 2024-05-13 RX ORDER — METHOCARBAMOL 500 MG/1
500 TABLET, FILM COATED ORAL 3 TIMES DAILY
Qty: 42 TABLET | Refills: 0 | Status: SHIPPED | OUTPATIENT
Start: 2024-05-13 | End: 2024-05-27

## 2024-05-13 RX ORDER — FENTANYL CITRATE 50 UG/ML
25-200 INJECTION, SOLUTION INTRAMUSCULAR; INTRAVENOUS
Status: DISCONTINUED | OUTPATIENT
Start: 2024-05-13 | End: 2024-05-13 | Stop reason: HOSPADM

## 2024-05-13 RX ORDER — ROCURONIUM BROMIDE 10 MG/ML
INJECTION, SOLUTION INTRAVENOUS
Status: DISCONTINUED | OUTPATIENT
Start: 2024-05-13 | End: 2024-05-13

## 2024-05-13 RX ORDER — OXYCODONE HYDROCHLORIDE 5 MG/1
5 TABLET ORAL EVERY 6 HOURS PRN
Qty: 15 TABLET | Refills: 0 | Status: SHIPPED | OUTPATIENT
Start: 2024-05-13

## 2024-05-13 RX ORDER — IBUPROFEN 600 MG/1
600 TABLET ORAL 4 TIMES DAILY
Qty: 56 TABLET | Refills: 0 | Status: SHIPPED | OUTPATIENT
Start: 2024-05-13 | End: 2024-05-27

## 2024-05-13 RX ORDER — ACETAMINOPHEN 10 MG/ML
INJECTION, SOLUTION INTRAVENOUS
Status: DISCONTINUED | OUTPATIENT
Start: 2024-05-13 | End: 2024-05-13

## 2024-05-13 RX ORDER — EPHEDRINE SULFATE 50 MG/ML
INJECTION, SOLUTION INTRAVENOUS
Status: DISCONTINUED | OUTPATIENT
Start: 2024-05-13 | End: 2024-05-13

## 2024-05-13 RX ADMIN — CEFAZOLIN 2 G: 330 INJECTION, POWDER, FOR SOLUTION INTRAMUSCULAR; INTRAVENOUS at 07:05

## 2024-05-13 RX ADMIN — MIDAZOLAM 1 MG: 1 INJECTION INTRAMUSCULAR; INTRAVENOUS at 06:05

## 2024-05-13 RX ADMIN — SODIUM CHLORIDE: 0.9 INJECTION, SOLUTION INTRAVENOUS at 06:05

## 2024-05-13 RX ADMIN — ACETAMINOPHEN 1000 MG: 10 INJECTION, SOLUTION INTRAVENOUS at 08:05

## 2024-05-13 RX ADMIN — EPHEDRINE SULFATE 5 MG: 50 INJECTION INTRAVENOUS at 07:05

## 2024-05-13 RX ADMIN — VANCOMYCIN HYDROCHLORIDE 1000 MG: 1 INJECTION, POWDER, LYOPHILIZED, FOR SOLUTION INTRAVENOUS at 05:05

## 2024-05-13 RX ADMIN — PROPOFOL 50 MG: 10 INJECTION, EMULSION INTRAVENOUS at 07:05

## 2024-05-13 RX ADMIN — LIDOCAINE HYDROCHLORIDE 60 MG: 20 INJECTION INTRAVENOUS at 07:05

## 2024-05-13 RX ADMIN — PROPOFOL 100 MG: 10 INJECTION, EMULSION INTRAVENOUS at 07:05

## 2024-05-13 RX ADMIN — FENTANYL CITRATE 50 MCG: 50 INJECTION INTRAMUSCULAR; INTRAVENOUS at 06:05

## 2024-05-13 RX ADMIN — ROCURONIUM BROMIDE 50 MG: 10 INJECTION, SOLUTION INTRAVENOUS at 07:05

## 2024-05-13 RX ADMIN — DEXAMETHASONE SODIUM PHOSPHATE 8 MG: 4 INJECTION, SOLUTION INTRAMUSCULAR; INTRAVENOUS at 08:05

## 2024-05-13 RX ADMIN — MUPIROCIN: 20 OINTMENT TOPICAL at 05:05

## 2024-05-13 RX ADMIN — FENTANYL CITRATE 50 MCG: 50 INJECTION, SOLUTION INTRAMUSCULAR; INTRAVENOUS at 08:05

## 2024-05-13 RX ADMIN — FENTANYL CITRATE 100 MCG: 50 INJECTION, SOLUTION INTRAMUSCULAR; INTRAVENOUS at 07:05

## 2024-05-13 RX ADMIN — ONDANSETRON 4 MG: 2 INJECTION INTRAMUSCULAR; INTRAVENOUS at 08:05

## 2024-05-13 RX ADMIN — EPHEDRINE SULFATE 10 MG: 50 INJECTION INTRAVENOUS at 08:05

## 2024-05-13 RX ADMIN — SUGAMMADEX 200 MG: 100 INJECTION, SOLUTION INTRAVENOUS at 08:05

## 2024-05-13 RX ADMIN — BUPIVACAINE HYDROCHLORIDE 15 ML: 7.5 INJECTION, SOLUTION EPIDURAL; RETROBULBAR at 06:05

## 2024-05-13 RX ADMIN — EPHEDRINE SULFATE 10 MG: 50 INJECTION INTRAVENOUS at 07:05

## 2024-05-13 NOTE — DISCHARGE SUMMARY
[facility] - [facility] (2nd Fl)  Discharge Note  Short Stay    Procedure(s) (LRB):  ORIF, FRACTURE, RADIUS, DISTAL (Left)      OUTCOME: Patient tolerated treatment/procedure well without complication and is now ready for discharge.    DISPOSITION: Home or Self Care    FINAL DIAGNOSIS:  Closed traumatic displaced fracture of distal end of left radius    FOLLOWUP: In clinic    DISCHARGE INSTRUCTIONS:    Discharge Procedure Orders   Notify your health care provider if you experience any of the following:  temperature >100.4     Notify your health care provider if you experience any of the following:  persistent nausea and vomiting or diarrhea     Notify your health care provider if you experience any of the following:  severe uncontrolled pain     Leave dressing on - Keep it clean, dry, and intact until clinic visit     Activity as tolerated     Weight bearing restrictions (specify):   Order Comments: Non weight bearing left arm        TIME SPENT ON DISCHARGE: 20 minutes

## 2024-05-13 NOTE — DISCHARGE INSTRUCTIONS
Non weight bearing left wrist  Wear left wrist brace  Keep dressings clean and dry   Follow up in clinic in 2 weeks

## 2024-05-13 NOTE — ANESTHESIA POSTPROCEDURE EVALUATION
Anesthesia Post Evaluation    Patient: Romelia Douglass    Procedure(s) Performed: Procedure(s) (LRB):  ORIF, FRACTURE, RADIUS, DISTAL (Left)    Final Anesthesia Type: general      Level of consciousness: awake and alert  Post-procedure vital signs: reviewed and stable  Pain control: Pain has been treated.  Airway patency: patent    PONV status: Absent or treated.  Anesthetic complications: no      Cardiovascular status: hemodynamically stable  Respiratory status: unassisted  Hydration status: euvolemic                Vitals Value Taken Time   /84 05/13/24 1000   Temp 36.6 °C (97.8 °F) 05/13/24 0900   Pulse 72 05/13/24 1000   Resp 20 05/13/24 1000   SpO2 96 % 05/13/24 1000         No case tracking events are documented in the log.      Pain/Serg Score: Pain Rating Prior to Med Admin: 0 (5/13/2024  6:40 AM)  Pain Rating Post Med Admin: 0 (5/13/2024  6:55 AM)  Serg Score: 9 (5/13/2024  9:30 AM)

## 2024-05-13 NOTE — INTERVAL H&P NOTE
The patient has been examined and the H&P has been reviewed:    No change to H&P.  To OR for ORIF left distal radius fracture.    The risks, benefits and alternatives to surgery were discussed with the patient at great length.  These include bleeding, infection, vessel/nerve damage, pain, numbness, tingling, complex regional pain syndrome, hardware/surgical failure, need for further surgery, malunion, nonunion, stiffness, DVT, PE, arthritis and death.  Patient states an understanding and wishes to proceed with surgery.   All questions were answered.  No guarantees were implied or stated.  Informed consent was obtained.          Active Hospital Problems    Diagnosis  POA    Closed traumatic displaced fracture of distal end of left radius [S52.502A]  Yes      Resolved Hospital Problems   No resolved problems to display.

## 2024-05-13 NOTE — ANESTHESIA PROCEDURE NOTES
Intubation    Date/Time: 5/13/2024 7:15 AM    Performed by: Rogerio Wiggins CRNA  Authorized by: Ryan Anaya MD    Intubation:     Induction:  Intravenous    Intubated:  Postinduction    Mask Ventilation:  Easy with oral airway    Attempts:  1    Attempted By:  CRNA    Method of Intubation:  Video laryngoscopy    Blade:  Mckeon 3    Laryngeal View Grade: Grade I - full view of cords      Difficult Airway Encountered?: No      Complications:  None    Airway Device:  Oral endotracheal tube    Airway Device Size:  7.0    Style/Cuff Inflation:  Cuffed    Tube secured:  21    Secured at:  The lips    Placement Verified By:  Capnometry    Complicating Factors:  Small mouth and anterior larynx    Findings Post-Intubation:  BS equal bilateral and atraumatic/condition of teeth unchanged

## 2024-05-13 NOTE — ANESTHESIA PROCEDURE NOTES
Left Supraclavicular SS    Patient location during procedure: pre-op   Block not for primary anesthetic.  Reason for block: at surgeon's request and post-op pain management   Post-op Pain Location: left wrist pain   Start time: 5/13/2024 6:40 AM  Timeout: 5/13/2024 6:39 AM   End time: 5/13/2024 6:45 AM    Staffing  Authorizing Provider: Julian Minor MD  Performing Provider: Julien Silva MD    Staffing  Performed by: Julien Silva MD  Authorized by: Julian Minor MD    Preanesthetic Checklist  Completed: patient identified, IV checked, site marked, risks and benefits discussed, surgical consent, monitors and equipment checked, pre-op evaluation and timeout performed  Peripheral Block  Patient position: supine  Prep: ChloraPrep  Patient monitoring: heart rate, cardiac monitor, continuous pulse ox, continuous capnometry and frequent blood pressure checks  Block type: supraclavicular  Laterality: left  Injection technique: single shot  Needle  Needle type: Stimuplex   Needle gauge: 21 G  Needle length: 2 in  Needle localization: ultrasound guidance and anatomical landmarks   -ultrasound image captured on disc.  Assessment  Injection assessment: negative aspiration and negative parasthesia  Paresthesia pain: none  Heart rate change: no  Slow fractionated injection: yes    Medications:    Medications: bupivacaine (pf) (MARCAINE) injection 0.75% - Perineural   15 mL - 5/13/2024 6:45:00 AM    Additional Notes  VSS.  [facility] RN monitoring vitals throughout procedure.  Patient tolerated procedure well.

## 2024-05-13 NOTE — TRANSFER OF CARE
"Anesthesia Transfer of Care Note    Patient: Romelia Douglass    Procedure(s) Performed: Procedure(s) (LRB):  ORIF, FRACTURE, RADIUS, DISTAL (Left)    Patient location: PACU    Transport from OR: Transported from OR on 6-10 L/min O2 by face mask with adequate spontaneous ventilation    Post pain: adequate analgesia    Post assessment: no apparent anesthetic complications and tolerated procedure well    Post vital signs: stable    Level of consciousness: awake and alert    Nausea/Vomiting: no nausea/vomiting    Complications: none    Transfer of care protocol was followed    Last vitals: Visit Vitals  BP (!) 142/77 (BP Location: Right arm, Patient Position: Lying)   Pulse 65   Temp 36.6 °C (97.9 °F) (Temporal)   Resp 12   Ht 5' 4" (1.626 m)   Wt 72.6 kg (160 lb)   LMP 10/26/2011   SpO2 98%   Breastfeeding No   BMI 27.46 kg/m²     "

## 2024-05-13 NOTE — OP NOTE
OP NOTE    DOS:  05/13/2024    Preop Dx: Left extra-articular distal radius fracture    Postop Dx: Left extra-articular distal radius fracture    Procedure: Open reduction internal fixation left distal radius fracture -     Surgeon: Cole Silveira M.D.    Asst:  Gold Jarvis M.D    Anesthesia: General    EBL:  Minimal    IVF:  1000 cc crystalloid    Implants: Synthes 3 hole distal radius VA volar plate and screws    Specimens: None    Findings: Dorsal comminution.  Good restoration of radial height, inclination and volar tilt.    Dispo:  To PACU awake/stable       Indications for Procedure:      68-year-old female fell on outstretched left hand resulting in a dorsally comminuted left distal radius fracture with significant displacement.  We discussed treatment options and proceeding with ORIF.  The risks, benefits and alternatives to surgery discussed with the patient prior to going the operating room.  Informed consent was obtained.    Procedure in Detail:    Patient was identified in preoperative holding area the site was marked.  Regional analgesia was administered.  Patient was wheeled to the operating room placed on the operating table in supine position.  General anesthesia was induced and preoperative antibiotics were administered.  Left upper extremity was placed into a nonsterile tourniquet and then prepped and draped in sterile fashion.  A time-out was undertaken to confirm patient, side, site, surgery, surgeon and administration of preoperative antibiotics.  All agreed we proceeded.  The arm was exsanguinated and the tourniquet was raised.      I made a standard FCR approach to the distal radius.  I incised the pronator quadratus in an L shape off of the distal radius.  I released the brachioradialis.  I pulled length alignment to reduce the distal articular block.  I placed a 1.8 mm K-wire from the radial styloid out the ulnar side of the shaft.  I still had a small amount of radial translation.       I selected the appropriate plate and affixed it to the shaft with a K-wire through a locking tower.  I purposely put this slightly radial so as to screw it over later once I had distal fixation.  I placed an Adson under the proximal part of the plate to tilt this up.  I then placed a bicortical screw in the most ulnar hole to pull the plate down to bone and secured the distal block.  I then placed a locking screw just underneath the joint surface in the next radial hole.  I then placed a 3rd screw in the next radial hole.    At this point I removed the distal K-wire and lever the plate down to bone to restore volar tilt.  I then pulled the K-wire locking tower of the shaft and used a clamp to move the plate slightly ulnar while maintaining radial height.  I then placed eccentric screws in both the slotted hole and the proximal hole to shift the plate ulnar to regain anatomic alignment of the ulnar cortex distally.  I then placed a locking screw in the center hole.    I turned my attention back distally and placed the proximal row ulnar screw and the 2 distal radial styloid screws in good position.  20 degree lateral views confirmed that the screws were just underneath the joint surface but not penetrating the joint.  Dorsal tangential views were obtained to ensure that the screws were not prominent dorsally.  DRUJ was stressed and found to be stable.    The tourniquet was let down and hemostasis obtained with electrocautery.  The wound was copiously irrigated with several L of normal saline solution.  The pronator quadratus was fairly damaged and not repairable.  The subcutaneous tissue was closed with 3-0 Vicryl suture over vancomycin cefepime powder.  The subcuticular layer was closed with 3-0 Monocryl suture and the skin with Dermabond.  A sterile silver lined dressing was applied followed by a risk compression sleeve and a Velcro wrist splint.    All instrument and sponge counts were reported correct at the  end of the case.  There no complications.  The patient was extubated, awakened and taken to the recovery room stable condition.      Plan the patient:     Multimodal pain management limiting narcotics.  Finger range of motion immediately.  OT at 2 weeks to begin gentle wrist range of motion.    Cole Silveira MD

## 2024-05-14 NOTE — TELEPHONE ENCOUNTER
"Pt states sx this AM, pt asking if she is supposed to sleep with "thing around my neck, splint." Attempted to clarify if pt has sling in place, pt states no, velcro around thumb and on hand. Per pt discharge instructions Leave dressing on - Keep it clean, dry, and intact until clinic visit    Pt verbalizes understanding and advised to call back for any further questions or concerns.   Reason for Disposition   General activity, questions about    Additional Information   Negative: Sounds like a life-threatening emergency to the triager    Protocols used: Post-Op Symptoms and [id number]    "

## 2024-05-27 ENCOUNTER — OFFICE VISIT (OUTPATIENT)
Dept: ORTHOPEDICS | Facility: CLINIC | Age: 69
End: 2024-05-27
Payer: MEDICARE

## 2024-05-27 ENCOUNTER — TELEPHONE (OUTPATIENT)
Dept: ORTHOPEDICS | Facility: CLINIC | Age: 69
End: 2024-05-27

## 2024-05-27 DIAGNOSIS — S52.502D CLOSED TRAUMATIC DISPLACED FRACTURE OF DISTAL END OF LEFT RADIUS WITH ROUTINE HEALING, SUBSEQUENT ENCOUNTER: Primary | ICD-10-CM

## 2024-05-27 DIAGNOSIS — S62.102A CLOSED FRACTURE OF LEFT WRIST, INITIAL ENCOUNTER: ICD-10-CM

## 2024-05-27 PROCEDURE — 4010F ACE/ARB THERAPY RXD/TAKEN: CPT | Mod: CPTII,S$GLB,, | Performed by: PHYSICIAN ASSISTANT

## 2024-05-27 PROCEDURE — 99999 PR PBB SHADOW E&M-EST. PATIENT-LVL I: CPT | Mod: PBBFAC,,, | Performed by: PHYSICIAN ASSISTANT

## 2024-05-27 PROCEDURE — 99024 POSTOP FOLLOW-UP VISIT: CPT | Mod: S$GLB,,, | Performed by: PHYSICIAN ASSISTANT

## 2024-05-27 NOTE — PROGRESS NOTES
Principal Orthopedic Problem: Left extra-articular distal radius fracture      05/13/24: :  Open reduction internal fixation left distal radius fracture     Ms. Douglass is here today for a post-operative visit    Interval History:  she reports that she is doing well.   she is at home . she is  participating in PT/OT.   Pain is controleld.  she is not taking pain medication.    she denies fever, chills, and sweats .     Physical exam:    Patient arrives to exam room: walked in brace in place.  Patient is  accompanied    Dressing taken down.  Incision is clean, dry and intact.  Clsoed stratifix and dermabond   Healing well no signs of breakdown or infection.    RADS: All pertinent images were reviewed by myself:   none done today    Assessment:  Post-op visit ( 2 weeks)    Plan:  Current care, treatment plan, precautions, activity level/ modifications, limitations, rehabilitation exercises and proposed future treatment were discussed with the patient. We discussed the need to monitor for changes in symptoms and condition and report them to the physician.  Discussed importance of compliance with all appointments and follow up examinations.     WOUND CARE :  - The patient was advised to keep the incision clean and dry for the next 24 hours after which she may wash the area with antibacterial soap in the shower. Will not submerge until the incision is completely healed  -Patient was advised to monitor wound closely and multiple times daily for any problems. Call clinic immediately or report to ED for immediate medical attention for any complications including reopening of wound, drainage, purulence, redness, streaking, odor, pain out of proportion, fever, chills, etc.       ACTIVITY:   -light  -range of motion as tolerated    - ADL 1-2 lbs max      -PT/OT, [facility], Patient is responsible to establish and continue care      PAIN MEDICATION:   - Multimodal pain control  - Pain medication: refill was not needed  -  Pain medication refill policy provided to patient for review, yes.    - Patient was informed a multi-modal approach is used to treat their pain. With the goal to get off of narcotic pain medication and discontinue as soon as possible.   - ice and elevation to reduce pain and swelling     DVT PROPHYLAXIS:   - Ambulatory    FOLLOW UP:   - Patient will follow up in the clinic in 4 weeks, sooner if any concerns.  - X-ray of her wrist is needed. OOB      If there are any questions prior to scheduled follow up, the patient was instructed to contact the office

## 2024-06-26 ENCOUNTER — OFFICE VISIT (OUTPATIENT)
Dept: ORTHOPEDICS | Facility: CLINIC | Age: 69
End: 2024-06-26
Payer: MEDICARE

## 2024-06-26 ENCOUNTER — HOSPITAL ENCOUNTER (OUTPATIENT)
Dept: RADIOLOGY | Facility: HOSPITAL | Age: 69
Discharge: HOME | End: 2024-06-26
Attending: PHYSICIAN ASSISTANT
Payer: MEDICARE

## 2024-06-26 DIAGNOSIS — S52.502D CLOSED TRAUMATIC DISPLACED FRACTURE OF DISTAL END OF LEFT RADIUS WITH ROUTINE HEALING, SUBSEQUENT ENCOUNTER: Primary | ICD-10-CM

## 2024-06-26 DIAGNOSIS — S52.502D CLOSED TRAUMATIC DISPLACED FRACTURE OF DISTAL END OF LEFT RADIUS WITH ROUTINE HEALING, SUBSEQUENT ENCOUNTER: ICD-10-CM

## 2024-06-26 PROCEDURE — 73110 X-RAY EXAM OF WRIST: CPT | Mod: TC,LT

## 2024-06-26 PROCEDURE — 73110 X-RAY EXAM OF WRIST: CPT | Mod: 26,LT,, | Performed by: RADIOLOGY

## 2024-06-26 PROCEDURE — 99024 POSTOP FOLLOW-UP VISIT: CPT | Mod: S$GLB,,, | Performed by: PHYSICIAN ASSISTANT

## 2024-06-26 PROCEDURE — 4010F ACE/ARB THERAPY RXD/TAKEN: CPT | Mod: CPTII,S$GLB,, | Performed by: PHYSICIAN ASSISTANT

## 2024-07-02 NOTE — PROGRESS NOTES
Principal Orthopedic Problem: Left extra-articular distal radius fracture      05/13/24: :  Open reduction internal fixation left distal radius fracture     Ms. Douglass is here today for a post-operative visit    Interval History:  she reports that she is doing well.   she is at home . she is  participating in PT/OT.   Pain is controleld.  she is not taking pain medication.    she denies fever, chills, and sweats .     Physical exam:    Patient arrives to exam room: walked in Patient is un accompanied    Incision is clean, dry and intact.     Healing well no signs of breakdown or infection. NVI, able to make full fist     RADS: All pertinent images were reviewed by myself:   Ventral plate and screws bridges the fracture of the distal radius and is in good position and alignment. Fracture of the ulnar styloid is stable in position.     Assessment:  Post-op visit ( 6 weeks)    Plan:  Current care, treatment plan, precautions, activity level/ modifications, limitations, rehabilitation exercises and proposed future treatment were discussed with the patient. We discussed the need to monitor for changes in symptoms and condition and report them to the physician.  Discussed importance of compliance with all appointments and follow up examinations.     WOUND CARE :  -You may use vitamin E (break the capsule open), aloe, scar cream (mederma) or hand lotion to rub the scar.  You must rub across the scar and in the line of the scar.  The goal is to make the scar not tender and make the scar not adhere (stick to) the tissues below the scar.  There may be some mild discomfort with this initially.  That is okay.  Do not start this for 1 week after stitch or staple removal.  -  she may wash the area with antibacterial soap in the shower. Will not submerge until the incision is completely healed  -Patient was advised to monitor wound closely and multiple times daily for any problems. Call clinic immediately or report to ED  for immediate medical attention for any complications including reopening of wound, drainage, purulence, redness, streaking, odor, pain out of proportion, fever, chills, etc.       ACTIVITY:   -light  -range of motion as tolerated    - weight bear slowly increase      -PT/OT, [facility], Patient is responsible to establish and continue care      PAIN MEDICATION:   - Multimodal pain control  - Pain medication: refill was not needed  - Pain medication refill policy provided to patient for review, yes.    - Patient was informed a multi-modal approach is used to treat their pain. With the goal to get off of narcotic pain medication and discontinue as soon as possible.   - ice and elevation to reduce pain and swelling     DVT PROPHYLAXIS:   - Ambulatory    FOLLOW UP:   - Patient will follow up in the clinic in 6 weeks, sooner if any concerns.  - X-ray of her wrist is needed. OOB      If there are any questions prior to scheduled follow up, the patient was instructed to contact the office

## 2024-08-12 ENCOUNTER — HOSPITAL ENCOUNTER (OUTPATIENT)
Dept: RADIOLOGY | Facility: HOSPITAL | Age: 69
Discharge: HOME | End: 2024-08-12
Attending: PHYSICIAN ASSISTANT
Payer: MEDICARE

## 2024-08-12 ENCOUNTER — OFFICE VISIT (OUTPATIENT)
Dept: ORTHOPEDICS | Facility: CLINIC | Age: 69
End: 2024-08-12
Payer: MEDICARE

## 2024-08-12 VITALS — BODY MASS INDEX: 27.33 KG/M2 | HEIGHT: 64 IN | WEIGHT: 160.06 LBS

## 2024-08-12 DIAGNOSIS — S52.502D CLOSED TRAUMATIC DISPLACED FRACTURE OF DISTAL END OF LEFT RADIUS WITH ROUTINE HEALING, SUBSEQUENT ENCOUNTER: ICD-10-CM

## 2024-08-12 DIAGNOSIS — S52.502D CLOSED TRAUMATIC DISPLACED FRACTURE OF DISTAL END OF LEFT RADIUS WITH ROUTINE HEALING, SUBSEQUENT ENCOUNTER: Primary | ICD-10-CM

## 2024-08-12 PROCEDURE — 1159F MED LIST DOCD IN RCRD: CPT | Mod: CPTII,S$GLB,, | Performed by: PHYSICIAN ASSISTANT

## 2024-08-12 PROCEDURE — 73110 X-RAY EXAM OF WRIST: CPT | Mod: TC,LT

## 2024-08-12 PROCEDURE — 73110 X-RAY EXAM OF WRIST: CPT | Mod: 26,LT,, | Performed by: INTERNAL MEDICINE

## 2024-08-12 PROCEDURE — 4010F ACE/ARB THERAPY RXD/TAKEN: CPT | Mod: CPTII,S$GLB,, | Performed by: PHYSICIAN ASSISTANT

## 2024-08-12 PROCEDURE — 99024 POSTOP FOLLOW-UP VISIT: CPT | Mod: S$GLB,,, | Performed by: PHYSICIAN ASSISTANT

## 2024-08-12 PROCEDURE — 1126F AMNT PAIN NOTED NONE PRSNT: CPT | Mod: CPTII,S$GLB,, | Performed by: PHYSICIAN ASSISTANT

## 2024-08-12 PROCEDURE — 99999 PR PBB SHADOW E&M-EST. PATIENT-LVL III: CPT | Mod: PBBFAC,,, | Performed by: PHYSICIAN ASSISTANT

## 2024-08-12 NOTE — PROGRESS NOTES
Principal Orthopedic Problem: Left extra-articular distal radius fracture      05/13/24: :  Open reduction internal fixation left distal radius fracture     Ms. Douglass is here today for a post-operative visit    Interval History:  she reports that she is doing well.   she is at home . she is  participating in PT/OT.   Pain is controleld.  she is not taking pain medication.    she denies fever, chills, and sweats .     Physical exam:    Patient arrives to exam room: walked in Patient is un accompanied    Incision is clean, dry and intact.     Healing well no signs of breakdown or infection. NVI, able to make full fist     RADS: All pertinent images were reviewed by myself:   There is osseous demineralization. There is a stable appearing ulnar styloid fracture. Again, there are surgical changes related to internal fixation of the previously demonstrated distal radial fracture. The hardware is intact. Alignment is stable. There has been some interval healing compared to the previous imaging. There is no new fracture. Mild degenerative changes noted.      Assessment:  Post-op visit ( 12 weeks)    Plan:  Current care, treatment plan, precautions, activity level/ modifications, limitations, rehabilitation exercises and proposed future treatment were discussed with the patient. We discussed the need to monitor for changes in symptoms and condition and report them to the physician.  Discussed importance of compliance with all appointments and follow up examinations.     WOUND CARE :  -You may use vitamin E (break the capsule open), aloe, scar cream (mederma) or hand lotion to rub the scar.  You must rub across the scar and in the line of the scar.  The goal is to make the scar not tender and make the scar not adhere (stick to) the tissues below the scar.  There may be some mild discomfort with this initially.  That is okay.  Do not start this for 1 week after stitch or staple removal.  -  she may wash the area with  antibacterial soap in the shower. Will not submerge until the incision is completely healed  -Patient was advised to monitor wound closely and multiple times daily for any problems. Call clinic immediately or report to ED for immediate medical attention for any complications including reopening of wound, drainage, purulence, redness, streaking, odor, pain out of proportion, fever, chills, etc.       ACTIVITY:   -light  -range of motion as tolerated    - weight bear slowly increase      -PT/OT, [facility], Patient is responsible to establish and continue care      PAIN MEDICATION:   - Multimodal pain control  - Pain medication: refill was not needed  - Pain medication refill policy provided to patient for review, yes.    - Patient was informed a multi-modal approach is used to treat their pain. With the goal to get off of narcotic pain medication and discontinue as soon as possible.   - ice and elevation to reduce pain and swelling     DVT PROPHYLAXIS:   - Ambulatory    FOLLOW UP:   - Patient will follow up in the clinic in 12 weeks, sooner if any concerns.  - X-ray of her wrist is needed. OOB      If there are any questions prior to scheduled follow up, the patient was instructed to contact the office

## (undated) DEVICE — CATH SUCTION 10FR

## (undated) DEVICE — BRACE WRIST FOREARM L

## (undated) DEVICE — SUT VICRYL PLUS 3-0 SH 18IN

## (undated) DEVICE — TOURNIQUET SB QC DP 18X4IN

## (undated) DEVICE — TRAY MINOR ORTHO OMC

## (undated) DEVICE — DRAPE HAND STERILE

## (undated) DEVICE — SPONGE GAUZE 16PLY 4X4

## (undated) DEVICE — ADHESIVE DERMABOND ADVANCED

## (undated) DEVICE — BIT DRILL QC 1.8X110MM

## (undated) DEVICE — SPONGE COTTON TRAY 4X4IN

## (undated) DEVICE — 2.0 DRILL BIT

## (undated) DEVICE — TAPE SURG DURAPORE 2 X10YD

## (undated) DEVICE — DRESSING AQUACEL AG ADV 3.5X6

## (undated) DEVICE — DRAPE TOP 53X102IN

## (undated) DEVICE — APPLICATOR CHLORAPREP ORN 26ML

## (undated) DEVICE — DRAPE THREE-QTR REINF 53X77IN

## (undated) DEVICE — BNDG COFLEX FOAM LF2 ST 4X5YD

## (undated) DEVICE — DRAPE STERI-DRAPE 1000 17X11IN

## (undated) DEVICE — K-WIRE TRCR PT1.25MM D 150MM L
Type: IMPLANTABLE DEVICE | Site: WRIST | Status: NON-FUNCTIONAL
Removed: 2024-05-13

## (undated) DEVICE — SUT MONOCRYL 3-0 PS-2 UND

## (undated) DEVICE — DRAPE C-ARM ELAS CLIP 42X120IN

## (undated) DEVICE — ELECTRODE REM PLYHSV RETURN 9

## (undated) DEVICE — Device

## (undated) DEVICE — GOWN AERO CHROME W/ TOWEL XL

## (undated) DEVICE — TOWEL OR DISP STRL BLUE 4/PK

## (undated) DEVICE — DRAPE U SPLIT SHEET 54X76IN

## (undated) DEVICE — WIRE FIX KIRSCH TRCR 1.8X150MM
Type: IMPLANTABLE DEVICE | Site: WRIST | Status: NON-FUNCTIONAL
Removed: 2024-05-13

## (undated) DEVICE — BANDAGE ESMARK ELASTIC ST 4X9